# Patient Record
Sex: MALE | Race: WHITE | NOT HISPANIC OR LATINO | Employment: FULL TIME | URBAN - NONMETROPOLITAN AREA
[De-identification: names, ages, dates, MRNs, and addresses within clinical notes are randomized per-mention and may not be internally consistent; named-entity substitution may affect disease eponyms.]

---

## 2018-02-24 ENCOUNTER — OFFICE VISIT CONVERTED (OUTPATIENT)
Dept: FAMILY MEDICINE CLINIC | Age: 33
End: 2018-02-24
Attending: FAMILY MEDICINE

## 2019-10-31 ENCOUNTER — HOSPITAL ENCOUNTER (OUTPATIENT)
Dept: OTHER | Facility: HOSPITAL | Age: 34
Discharge: HOME | End: 2019-10-31
Attending: NURSE PRACTITIONER

## 2019-10-31 ENCOUNTER — OFFICE VISIT CONVERTED (OUTPATIENT)
Dept: FAMILY MEDICINE CLINIC | Age: 34
End: 2019-10-31
Attending: NURSE PRACTITIONER

## 2019-10-31 LAB
ALBUMIN SERPL-MCNC: 4.5 G/DL (ref 3.5–5)
ALBUMIN/GLOB SERPL: 1.5 {RATIO} (ref 1.4–2.6)
ALP SERPL-CCNC: 90 U/L (ref 53–128)
ALT SERPL-CCNC: 58 U/L (ref 10–40)
ANION GAP SERPL CALC-SCNC: 19 MMOL/L (ref 8–19)
AST SERPL-CCNC: 23 U/L (ref 15–50)
BILIRUB SERPL-MCNC: <0.15 MG/DL (ref 0.2–1.3)
BUN SERPL-MCNC: 9 MG/DL (ref 5–25)
BUN/CREAT SERPL: 10 {RATIO} (ref 6–20)
CALCIUM SERPL-MCNC: 9.4 MG/DL (ref 8.7–10.4)
CHLORIDE SERPL-SCNC: 101 MMOL/L (ref 99–111)
CHOLEST SERPL-MCNC: 248 MG/DL (ref 107–200)
CHOLEST/HDLC SERPL: 8.6 {RATIO} (ref 3–6)
CONV CO2: 24 MMOL/L (ref 22–32)
CONV TOTAL PROTEIN: 7.5 G/DL (ref 6.3–8.2)
CREAT UR-MCNC: 0.87 MG/DL (ref 0.7–1.2)
ERYTHROCYTE [DISTWIDTH] IN BLOOD BY AUTOMATED COUNT: 13.2 % (ref 11.6–14.4)
GFR SERPLBLD BASED ON 1.73 SQ M-ARVRAT: >60 ML/MIN/{1.73_M2}
GLOBULIN UR ELPH-MCNC: 3 G/DL (ref 2–3.5)
GLUCOSE SERPL-MCNC: 87 MG/DL (ref 70–99)
HCT VFR BLD AUTO: 45.3 % (ref 42–52)
HDLC SERPL-MCNC: 29 MG/DL (ref 40–60)
HGB BLD-MCNC: 15 G/DL (ref 14–18)
LDLC SERPL CALC-MCNC: 152 MG/DL (ref 70–100)
MCH RBC QN AUTO: 30.3 PG (ref 27–31)
MCHC RBC AUTO-ENTMCNC: 33.1 G/DL (ref 33–37)
MCV RBC AUTO: 91.5 FL (ref 80–96)
OSMOLALITY SERPL CALC.SUM OF ELEC: 288 MOSM/KG (ref 273–304)
PLATELET # BLD AUTO: 328 10*3/UL (ref 130–400)
PMV BLD AUTO: 9.2 FL (ref 9.4–12.4)
POTASSIUM SERPL-SCNC: 4.1 MMOL/L (ref 3.5–5.3)
RBC # BLD AUTO: 4.95 10*6/UL (ref 4.7–6.1)
SODIUM SERPL-SCNC: 140 MMOL/L (ref 135–147)
TRIGL SERPL-MCNC: 337 MG/DL (ref 40–150)
TSH SERPL-ACNC: 2.92 M[IU]/L (ref 0.27–4.2)
VLDLC SERPL-MCNC: 67 MG/DL (ref 5–37)
WBC # BLD AUTO: 10.61 10*3/UL (ref 4.8–10.8)

## 2021-01-26 ENCOUNTER — OFFICE VISIT CONVERTED (OUTPATIENT)
Dept: ORTHOPEDIC SURGERY | Facility: CLINIC | Age: 36
End: 2021-01-26
Attending: PHYSICIAN ASSISTANT

## 2021-02-05 ENCOUNTER — OFFICE VISIT CONVERTED (OUTPATIENT)
Dept: ORTHOPEDIC SURGERY | Facility: CLINIC | Age: 36
End: 2021-02-05
Attending: PHYSICIAN ASSISTANT

## 2021-02-23 ENCOUNTER — OFFICE VISIT CONVERTED (OUTPATIENT)
Dept: ORTHOPEDIC SURGERY | Facility: CLINIC | Age: 36
End: 2021-02-23
Attending: PHYSICIAN ASSISTANT

## 2021-02-26 ENCOUNTER — HOSPITAL ENCOUNTER (OUTPATIENT)
Dept: PREADMISSION TESTING | Facility: HOSPITAL | Age: 36
Discharge: HOME | End: 2021-02-26
Attending: ORTHOPAEDIC SURGERY

## 2021-02-26 LAB — SARS-COV-2 RNA SPEC QL NAA+PROBE: NOT DETECTED

## 2021-03-03 ENCOUNTER — HOSPITAL ENCOUNTER (OUTPATIENT)
Dept: PERIOP | Facility: HOSPITAL | Age: 36
Setting detail: HOSPITAL OUTPATIENT SURGERY
Discharge: HOME | End: 2021-03-03
Attending: ORTHOPAEDIC SURGERY

## 2021-03-18 ENCOUNTER — OFFICE VISIT CONVERTED (OUTPATIENT)
Dept: ORTHOPEDIC SURGERY | Facility: CLINIC | Age: 36
End: 2021-03-18

## 2021-04-20 ENCOUNTER — CONVERSION ENCOUNTER (OUTPATIENT)
Dept: ORTHOPEDIC SURGERY | Facility: CLINIC | Age: 36
End: 2021-04-20

## 2021-04-20 ENCOUNTER — OFFICE VISIT CONVERTED (OUTPATIENT)
Dept: ORTHOPEDIC SURGERY | Facility: CLINIC | Age: 36
End: 2021-04-20

## 2021-05-10 NOTE — H&P
"   History and Physical      Patient Name: Jake Flores   Patient ID: [id number]   Sex: Male   YOB: 1985        Visit Date: January 26, 2021    Provider: Lashell Lerner PA-C   Location: [facility]   Location Address: [address] [place] [address]  [id number]   Location Phone: [phone]          Chief Complaint  · right knee pain      History Of Present Illness  Jake Flores is a 35 year old /White male who presents today to [facility].      He is here for evaluation of right knee injury sustained 1/22/21 when he climbed down from a truck at work. He now has medial joint line pain, especially with pivoting.       Past Surgical History  Eye Implant; Hernia; Kidney         Medication List  naproxen 500 mg oral tablet         Allergy List  NO KNOWN DRUG ALLERGIES       Allergies Reconciled  Family Medical History  Heart Disease; Diabetes, unspecified type         Social History  Alcohol Use (Never); lives with spouse; Recreational Drug Use (Never); Single.; Tobacco (Current every day); Working         Review of Systems  · Constitutional  o Denies  o : fever, chills, weight loss  · Cardiovascular  o Denies  o : chest pain, shortness of breath  · Gastrointestinal  o Denies  o : liver disease, heartburn, nausea, blood in stools  · Genitourinary  o Denies  o : painful urination, blood in urine  · Integument  o Denies  o : rash, itching  · Neurologic  o Denies  o : headache, weakness, loss of consciousness  · Musculoskeletal  o Admits  o : painful, swollen joints  · Psychiatric  o Denies  o : drug/alcohol addiction, anxiety, depression      Vitals  Date Time BP Position Site L\R Cuff Size HR RR TEMP (F) WT  HT  BMI kg/m2 BSA m2 O2 Sat FR L/min FiO2        01/26/2021 10:43 AM      112 - R   250lbs 0oz 5'  11\" 34.87 2.38 98 %            Physical Examination  · Constitutional  o Appearance  o : well developed, well-nourished, no obvious deformities present  · Head and " Face  o Head  o :   § Inspection  § : normocephalic  o Face  o :   § Inspection  § : no facial lesions  · Eyes  o Conjunctivae  o : conjunctivae normal  o Sclerae  o : sclerae white  · Ears, Nose, Mouth and Throat  o Ears  o :   § External Ears  § : appearance within normal limits  § Hearing  § : intact  o Nose  o :   § External Nose  § : appearance normal  · Neck  o Inspection/Palpation  o : normal appearance  o Range of Motion  o : full range of motion  · Respiratory  o Respiratory Effort  o : breathing unlabored  o Inspection of Chest  o : normal appearance  o Auscultation of Lungs  o : no audible wheezing or rales  · Cardiovascular  o Heart  o : regular rate  · Gastrointestinal  o Abdominal Examination  o : soft and non-tender  · Skin and Subcutaneous Tissue  o General Inspection  o : intact, no rashes  · Psychiatric  o General  o : Alert and oriented x3  o Judgement and Insight  o : judgment and insight intact  o Mood and Affect  o : mood normal, affect appropriate  · Right Knee  o Inspection  o : 1+ effusion. Tender medial joint line. + guarding. Pain with valgus stress. Pain with weight bearing. + Gina's. + Apley's. Pain with weight bearing. Neurovascularly intact. Sensation intact. Good tone of quads, hamstrings, dorsiflexors, plantarflexors.               Assessment  · Right knee pain, unspecified chronicity     719.46/M25.561  · Knee injury     959.7/S89.90XA      Plan  · Medications  o Medications have been Reconciled  o Transition of Care or Provider Policy  · Instructions  o Reviewed the patient's Past Medical, Social, and Family history as well as the ROS at today's visit, no changes.  o Call or return if worsening symptoms.  o Proceed with right knee MRI. Follow up after test. Remain off work.  o Electronically Identified Patient Education Materials Provided Electronically            Electronically Signed by: DEMI PolancoC -Author on January 26, 2021 11:43:53 AM  Electronically Co-signed by:  Sarah Laguna MD -Reviewer on January 26, 2021 09:49:43 PM

## 2021-05-14 VITALS — HEART RATE: 112 BPM | WEIGHT: 250 LBS | BODY MASS INDEX: 35 KG/M2 | HEIGHT: 71 IN | OXYGEN SATURATION: 98 %

## 2021-05-14 VITALS — WEIGHT: 254.5 LBS | HEIGHT: 71 IN | HEART RATE: 105 BPM | OXYGEN SATURATION: 97 % | BODY MASS INDEX: 35.63 KG/M2

## 2021-05-14 VITALS — HEIGHT: 71 IN | OXYGEN SATURATION: 96 % | BODY MASS INDEX: 35.45 KG/M2 | HEART RATE: 117 BPM | WEIGHT: 253.25 LBS

## 2021-05-14 VITALS — HEART RATE: 117 BPM | HEIGHT: 71 IN | OXYGEN SATURATION: 97 % | BODY MASS INDEX: 35.98 KG/M2 | WEIGHT: 257 LBS

## 2021-05-14 VITALS — WEIGHT: 252 LBS | HEIGHT: 71 IN | OXYGEN SATURATION: 97 % | HEART RATE: 73 BPM | BODY MASS INDEX: 35.28 KG/M2

## 2021-05-14 NOTE — PROGRESS NOTES
"   Progress Note      Patient Name: Jake Flores   Patient ID: [id number]   Sex: Male   YOB: 1985        Visit Date: February 5, 2021    Provider: Lashell Lerner PA-C   Location: [facility]   Location Address: [address] [place] [address]  [id number]   Location Phone: [phone]          Chief Complaint  · Follow up right knee pain      History Of Present Illness  Jake Flores is a 35 year old /White male who presents today to [facility].      He is here for follow up right knee injury sustained at work, while getting out of a truck 1/22/21. He had MRI. He states medial knee pain and sensation of instability.       Past Surgical History  Eye Implant; Hernia; Kidney         Medication List  naproxen 500 mg oral tablet         Allergy List  NO KNOWN DRUG ALLERGIES       Allergies Reconciled  Family Medical History  Heart Disease; Diabetes, unspecified type         Social History  Alcohol Use (Never); lives with spouse; Recreational Drug Use (Never); Single.; Tobacco (Current every day); Working         Review of Systems  · Constitutional  o Denies  o : fever, chills, weight loss  · Cardiovascular  o Denies  o : chest pain, shortness of breath  · Gastrointestinal  o Denies  o : liver disease, heartburn, nausea, blood in stools  · Genitourinary  o Denies  o : painful urination, blood in urine  · Integument  o Denies  o : rash, itching  · Neurologic  o Denies  o : headache, weakness, loss of consciousness  · Musculoskeletal  o Admits  o : painful, swollen joints  · Psychiatric  o Denies  o : drug/alcohol addiction, anxiety, depression      Vitals  Date Time BP Position Site L\R Cuff Size HR RR TEMP (F) WT  HT  BMI kg/m2 BSA m2 O2 Sat FR L/min FiO2        02/05/2021 09:42 AM      117 - R   257lbs 0oz 5'  11\" 35.84 2.42 97 %            Physical Examination  · Constitutional  o Appearance  o : well developed, well-nourished, no obvious deformities present  · Head and " Face  o Head  o :   § Inspection  § : normocephalic  o Face  o :   § Inspection  § : no facial lesions  · Eyes  o Conjunctivae  o : conjunctivae normal  o Sclerae  o : sclerae white  · Ears, Nose, Mouth and Throat  o Ears  o :   § External Ears  § : appearance within normal limits  § Hearing  § : intact  o Nose  o :   § External Nose  § : appearance normal  · Neck  o Inspection/Palpation  o : normal appearance  o Range of Motion  o : full range of motion  · Respiratory  o Respiratory Effort  o : breathing unlabored  o Inspection of Chest  o : normal appearance  o Auscultation of Lungs  o : no audible wheezing or rales  · Cardiovascular  o Heart  o : regular rate  · Gastrointestinal  o Abdominal Examination  o : soft and non-tender  · Skin and Subcutaneous Tissue  o General Inspection  o : intact, no rashes  · Psychiatric  o General  o : Alert and oriented x3  o Judgement and Insight  o : judgment and insight intact  o Mood and Affect  o : mood normal, affect appropriate  · Right Knee  o Inspection  o : + effusion. Tender medial joint line. ROM 10-90 degrees. + Lachman's. + Gina's. Pain with weight bearing. All compartments soft and well perfused. Neurovascularly intact.   · Imaging  o Imaging  o : MRI Right knee 2/2/21: 1. Complete rupture of proximal third of ACL at the femoral attachment. 2. Vertically oriented longitundinal tear along the peripheral third of the posterior horn/posterior body segment medial meniscus, contacting the inferior articular surface. 3. Minimally impacted fracture of the posterior lip lateral tibial plateau. Nondepressed subarticular impaction fractures of the anterolateral weight bearing lateral femoral condyle and posterior lip of the medial tibial plateau. 4. Mild bone contusions of the anteromedial aspect of the medial tibial plateau and far medial aspect of the medial femoral condyle. 5. No evidence of significant articular cartilage loss. 6. Moderate joint effusion with 4cm  popliteal cyst and 5mm loose body posterior attachment of the PCL.          Assessment  · ACL tear     844.2/S83.519A  · MMT (medial meniscus tear)     836.0/S83.249A  · Pain: Knee     719.46/M25.569      Plan  · Medications  o Medications have been Reconciled  o Transition of Care or Provider Policy  · Instructions  o Dr. Laguna saw and examined the patient and agrees with plan.   o MRI reviewed by Dr. Laguna.  o Reviewed the patient's Past Medical, Social, and Family history as well as the ROS at today's visit, no changes.  o Call or return if worsening symptoms.  o Start pre-op PT. Follow up 2.5 weeks, check progress in ROM. Schedule surgery if ready.   o Electronically Identified Patient Education Materials Provided Electronically            Electronically Signed by: ABBY Polanco-C -Author on February 5, 2021 11:07:28 AM  Electronically Co-signed by: Sarah Laguna MD -Reviewer on February 8, 2021 05:14:11 PM

## 2021-05-14 NOTE — PROGRESS NOTES
"   Progress Note      Patient Name: Jake Flores   Patient ID: [id number]   Sex: Male   YOB: 1985        Visit Date: February 23, 2021    Provider: Lashell Lerner PA-C   [facility]   Location Address: [address] [place] [address]  [id number]   Location Phone: [phone]          Chief Complaint  · right knee pain      History Of Present Illness  Jake Flores is a 36 year old /White male who presents today to [facility].      He is here for check up prior to right ACL reconstruction. He has made good progress in PT. He states pain and swelling improved some.       Past Surgical History  Eye Implant; Hernia; Kidney         Medication List  naproxen 500 mg oral tablet         Allergy List  NO KNOWN DRUG ALLERGIES       Allergies Reconciled  Family Medical History  Heart Disease; Diabetes, unspecified type         Social History  Alcohol Use (Never); lives with spouse; Recreational Drug Use (Never); Single.; Tobacco (Current every day); Working         Review of Systems  · Constitutional  o Denies  o : fever, chills, weight loss  · Cardiovascular  o Denies  o : chest pain, shortness of breath  · Gastrointestinal  o Denies  o : liver disease, heartburn, nausea, blood in stools  · Genitourinary  o Denies  o : painful urination, blood in urine  · Integument  o Denies  o : rash, itching  · Neurologic  o Denies  o : headache, weakness, loss of consciousness  · Musculoskeletal  o Admits  o : painful, swollen joints  · Psychiatric  o Denies  o : drug/alcohol addiction, anxiety, depression      Vitals  Date Time BP Position Site L\R Cuff Size HR RR TEMP (F) WT  HT  BMI kg/m2 BSA m2 O2 Sat FR L/min FiO2 HC       02/23/2021 02:55 PM      105 - R   254lbs 8oz 5'  11\" 35.5 2.4 97 %            Physical Examination  · Constitutional  o Appearance  o : well developed, well-nourished, no obvious deformities present  · Head and Face  o Head  o :   § Inspection  § : " normocephalic  o Face  o :   § Inspection  § : no facial lesions  · Eyes  o Conjunctivae  o : conjunctivae normal  o Sclerae  o : sclerae white  · Ears, Nose, Mouth and Throat  o Ears  o :   § External Ears  § : appearance within normal limits  § Hearing  § : intact  o Nose  o :   § External Nose  § : appearance normal  · Neck  o Inspection/Palpation  o : normal appearance  o Range of Motion  o : full range of motion  · Respiratory  o Respiratory Effort  o : breathing unlabored  o Inspection of Chest  o : normal appearance  o Auscultation of Lungs  o : no audible wheezing or rales  · Cardiovascular  o Heart  o : regular rate  · Gastrointestinal  o Abdominal Examination  o : soft and non-tender  · Skin and Subcutaneous Tissue  o General Inspection  o : intact, no rashes  · Psychiatric  o General  o : Alert and oriented x3  o Judgement and Insight  o : judgment and insight intact  o Mood and Affect  o : mood normal, affect appropriate  · Right Knee  o Inspection  o : Trace effusion. Tender medial joint line. ROM 10-90 degrees. + Lachman's. + Gina's. Pain with weight bearing. All compartments soft and well perfused. Neurovascularly intact.   · Imaging  o Imaging  o : MRI Right knee 2/2/21: 1. Complete rupture of proximal third of ACL at the femoral attachment. 2. Vertically oriented longitundinal tear along the peripheral third of the posterior horn/posterior body segment medial meniscus, contacting the inferior articular surface. 3. Minimally impacted fracture of the posterior lip lateral tibial plateau. Nondepressed subarticular impaction fractures of the anterolateral weight bearing lateral femoral condyle and posterior lip of the medial tibial plateau. 4. Mild bone contusions of the anteromedial aspect of the medial tibial plateau and far medial aspect of the medial femoral condyle. 5. No evidence of significant articular cartilage loss. 6. Moderate joint effusion with 4cm popliteal cyst and 5mm loose body  posterior attachment of the PCL.          Assessment  · ACL tear     844.2/S83.519A  · MMT (medial meniscus tear)     836.0/S83.249A  · Pain: Knee     719.46/M25.569      Plan  · Medications  o Medications have been Reconciled  o Transition of Care or Provider Policy  · Instructions  o Reviewed the patient's Past Medical, Social, and Family history as well as the ROS at today's visit, no changes.  o Call or return if worsening symptoms.  o Discussed surgery.  o Risks/benefits discussed with patient including, but not limited to: infection, bleeding, neurovascular damage, re-rupture, aesthetic deformity, need for further surgery, and death.  o Discussed with patient the implant type being used during surgery and patient understands and desires to proceed.  o Proceed with right knee arthroscopic ACL reconstruction, pMMT.  o Electronically Identified Patient Education Materials Provided Electronically            Electronically Signed by: ABBY Polanco-C -Author on February 23, 2021 03:21:09 PM  Electronically Co-signed by: Sarah Laguna MD -Reviewer on February 24, 2021 07:42:15 AM

## 2021-05-14 NOTE — PROGRESS NOTES
"   Progress Note      Patient Name: Jake Flores   Patient ID: [id number]   Sex: Male   YOB: 1985        Visit Date: April 20, 2021    Provider: Kevin Michaud PA-C   Location: [facility]   Location Address: [address] [place] [address]  [id number]   Location Phone: [phone]          Chief Complaint  · Follow up right knee pain      History Of Present Illness  Jake Flores is a 36 year old /White male who presents today to [facility].      Patient follows up today for right knee arthroscopy with ACL reconstruction and partial meniscectomy performed 3/3/2021 by Dr. Laguna.  Patient reports pain has improved since the time of surgery.  Patient has been adherent to interventions and instructions.       Past Surgical History  Eye Implant; Hernia; Kidney         Medication List  naproxen 500 mg oral tablet         Allergy List  NO KNOWN DRUG ALLERGIES         Family Medical History  Heart Disease; Diabetes, unspecified type         Social History  Alcohol Use (Never); lives with spouse; Recreational Drug Use (Never); Single.; Tobacco (Current every day); Working         Review of Systems  · Constitutional  o Denies  o : fever, chills, weight loss  · Cardiovascular  o Denies  o : chest pain, shortness of breath  · Gastrointestinal  o Denies  o : liver disease, heartburn, nausea, blood in stools  · Genitourinary  o Denies  o : painful urination, blood in urine  · Integument  o Denies  o : rash, itching  · Neurologic  o Denies  o : headache, weakness, loss of consciousness  · Musculoskeletal  o Denies  o : painful, swollen joints  · Psychiatric  o Denies  o : drug/alcohol addiction, anxiety, depression      Vitals  Date Time BP Position Site L\R Cuff Size HR RR TEMP (F) WT  HT  BMI kg/m2 BSA m2 O2 Sat FR L/min FiO2 HC       04/20/2021 11:05 AM      73 - R   251lbs 16oz 5'  11\" 35.15 2.39 97 %            Physical Examination  · Constitutional  o Appearance  o : " "well developed, well-nourished, no obvious deformities present  · Head and Face  o Head  o :   § Inspection  § : normocephalic  o Face  o :   § Inspection  § : no facial lesions  · Eyes  o Conjunctivae  o : conjunctivae normal  o Sclerae  o : sclerae white  · Ears, Nose, Mouth and Throat  o Ears  o :   § External Ears  § : appearance within normal limits  § Hearing  § : intact  o Nose  o :   § External Nose  § : appearance normal  · Neck  o Inspection/Palpation  o : normal appearance  o Range of Motion  o : full range of motion  · Respiratory  o Respiratory Effort  o : breathing unlabored  o Inspection of Chest  o : normal appearance  o Auscultation of Lungs  o : no audible wheezing or rales  · Cardiovascular  o Heart  o : regular rate  · Gastrointestinal  o Abdominal Examination  o : soft and non-tender  · Skin and Subcutaneous Tissue  o General Inspection  o : intact, no rashes  · Psychiatric  o General  o : Alert and oriented x3  o Judgement and Insight  o : judgment and insight intact  o Mood and Affect  o : mood normal, affect appropriate  · Right Knee  o Inspection  o : Surgical sites appreciated to be healing appropriately with good scar formation that is supple. Range of motion is 0 degrees of extension to 105 degrees of flexion. Knee is stable to provocative testing. Neuro vascularly intact distally. Minimally antalgic but otherwise stable gait favoring the right.          Assessment  · Pain: Knee     719.46/M25.569  · Decreased range of motion (ROM) of right knee     719.56/M25.661  · Surgical aftercare, musculoskeletal system     V58.78/Z47.89      Plan  · Instructions  o Reviewed the patient's Past Medical, Social, and Family history as well as the ROS at today's visit, no changes.  o Call or return if worsening symptoms.  o Patient may progressively advance his activities as discussed in clinic today. Patient is to continue with physical therapy. Patient is to be placed in a \"playmaker\" type brace. " Patient to follow-up in 6 weeks to evaluate his course of healing.  o Portions of this note were generated with voice recognition software. While efforts have been made to proofread the text, some sound alike errors may still persist.             Electronically Signed by: ABBY Chu-LESLEE -Author on April 20, 2021 12:56:26 PM  Electronically Co-signed by: Sarah Laguna MD -Reviewer on April 20, 2021 05:58:43 PM

## 2021-05-14 NOTE — PROGRESS NOTES
"   Progress Note      Patient Name: Jake Flores   Patient ID: [id number]   Sex: Male   YOB: 1985        Visit Date: March 18, 2021    Provider: Kevin Michaud PA-C   Location: [facility]   Location Address: [address] [place] [address]  [id number]   Location Phone: [phone]          Chief Complaint  · right knee pain      History Of Present Illness  Jake Flores is a 36 year old /White male who presents today to [facility].      Patient follows up today for right knee ACL tear and meniscus tear status post right knee arthroscopic ACL reconstruction and partial meniscectomy performed 3/3/2021 by Dr. Laguna.  Patient reports pain has improved since the time of surgery.  Patient has been adherent to interventions and instructions.       Past Surgical History  Eye Implant; Hernia; Kidney         Medication List  naproxen 500 mg oral tablet         Allergy List  NO KNOWN DRUG ALLERGIES         Family Medical History  Heart Disease; Diabetes, unspecified type         Social History  Alcohol Use (Never); lives with spouse; Recreational Drug Use (Never); Single.; Tobacco (Current every day); Working         Review of Systems  · Constitutional  o Denies  o : fever, chills, weight loss  · Cardiovascular  o Denies  o : chest pain, shortness of breath  · Gastrointestinal  o Denies  o : liver disease, heartburn, nausea, blood in stools  · Genitourinary  o Denies  o : painful urination, blood in urine  · Integument  o Denies  o : rash, itching  · Neurologic  o Denies  o : headache, weakness, loss of consciousness  · Musculoskeletal  o Denies  o : painful, swollen joints  · Psychiatric  o Denies  o : drug/alcohol addiction, anxiety, depression      Vitals  Date Time BP Position Site L\R Cuff Size HR RR TEMP (F) WT  HT  BMI kg/m2 BSA m2 O2 Sat FR L/min FiO2 HC       03/18/2021 01:58 PM      117 - R   253lbs 4oz 5'  11\" 35.32 2.4 96 %            Physical " Examination  · Constitutional  o Appearance  o : well developed, well-nourished, no obvious deformities present  · Head and Face  o Head  o :   § Inspection  § : normocephalic  o Face  o :   § Inspection  § : no facial lesions  · Eyes  o Conjunctivae  o : conjunctivae normal  o Sclerae  o : sclerae white  · Ears, Nose, Mouth and Throat  o Ears  o :   § External Ears  § : appearance within normal limits  § Hearing  § : intact  o Nose  o :   § External Nose  § : appearance normal  · Neck  o Inspection/Palpation  o : normal appearance  o Range of Motion  o : full range of motion  · Respiratory  o Respiratory Effort  o : breathing unlabored  o Inspection of Chest  o : normal appearance  o Auscultation of Lungs  o : no audible wheezing or rales  · Cardiovascular  o Heart  o : regular rate  · Gastrointestinal  o Abdominal Examination  o : soft and non-tender  · Skin and Subcutaneous Tissue  o General Inspection  o : intact, no rashes  · Psychiatric  o General  o : Alert and oriented x3  o Judgement and Insight  o : judgment and insight intact  o Mood and Affect  o : mood normal, affect appropriate  · Right Knee  o Inspection  o : Surgical sites are appreciated to be healing appropriately and sutures are removed. ROM demonstrated today is 0 degrees of extension to approximately 90 degrees of flexion. Swelling is present about the knee.          Assessment  · Pain: Knee     719.46/M25.569  · Surgical aftercare, musculoskeletal system     V58.78/Z47.89  · Rupture of anterior cruciate ligament of right knee, subsequent encounter       Sprain of anterior cruciate ligament of right knee, subsequent encounter     V58.89/S83.511D      Plan  · Instructions  o Reviewed the patient's Past Medical, Social, and Family history as well as the ROS at today's visit, no changes.  o Call or return if worsening symptoms.  o Patient is 2 weeks postop at this time and sutures are removed. Patient is to continue use of T ROM brace with it  locked at 0 degrees of extension to 90 degrees of flexion. Patient is to continue with physical therapy and follow-up in 4 weeks.            Electronically Signed by: Kevin Michaud PA-C -Author on March 18, 2021 07:29:52 PM

## 2021-05-18 NOTE — PROGRESS NOTES
Jake Flores 1985     Office/Outpatient Visit    Visit Date: Thu, Oct 31, 2019 04:27 pm    Provider: Yvette Tomlinson N.P. (Assistant: Meli Roca MA)    Location: [facility]        Electronically signed by Yvette Tomlinson N.P. on  11/01/2019 01:03:48 PM                             SUBJECTIVE:        CC:     Mr. Flores is a 34 year old White male.  He presents with axillary itching & hair loss.          HPI:         Mr. Flores presents with health checkup.  He cannot recall when he last had a physical exam.  He's had vision screening done <6 months ago and this was abnormal.  He is current with his Td.  He is not current with influenza immunization.          PHQ-9 Depression Screening: Completed form scanned and in chart; Total Score 3     ROS:     CONSTITUTIONAL:  Negative for chills, fatigue, fever, and weight change.      EYES:  Negative for blurred vision, eye pain, and photophobia.      E/N/T:  Negative for hearing problems, E/N/T pain, congestion, rhinorrhea, epistaxis, hoarseness, and dental problems.      CARDIOVASCULAR:  Negative for chest pain, palpitations, tachycardia, orthopnea, and edema.      RESPIRATORY:  Negative for cough, dyspnea, and hemoptysis.      GASTROINTESTINAL:  Negative for abdominal pain, heartburn, constipation, diarrhea, and stool changes.      GENITOURINARY:  Negative for dysuria, genital lesions, hematuria, impotence, polyuria, and changes in urine stream.      MUSCULOSKELETAL:  Negative for arthralgias, back pain, and myalgias.      NEUROLOGICAL:  Negative for dizziness, headaches, paresthesias, and weakness.      ENDOCRINE:  Negative for hair loss, heat/cold intolerance, polydipsia, and polyphagia.      PSYCHIATRIC:  Negative for anxiety, depression, and sleep disturbances.          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          Alcohol: Frequency: Socially     Caffeine:  He admits to consuming caffeine via tea ( 3 servings per day ) and soda ( 4 servings per day ).          Substance Abuse History:     Last Reviewed on 2/24/2018 12:50 PM by Latricia Juarez        Mental Health History:     Last Reviewed on 2/24/2018 12:50 PM by Latricia Juarez        Communicable Diseases (eg STDs):     Last Reviewed on 2/24/2018 12:50 PM by Latricia Juarez            Current Problems:     Last Reviewed on 2/24/2018 12:50 PM by Latricia Juarez      None Recorded         Immunizations:     None        Allergies:     Last Reviewed on 2/24/2018 12:50 PM by Latricia Juarez      No Known Drug Allergies.         Current Medications:     Last Reviewed on 10/31/2019 04:30 PM by Meli Roca      No Known Medications.         OBJECTIVE:        Vitals:         Historical:     02/24/2018  BP:   111/64 mm Hg ( (left arm, , sitting, );)     02/24/2018  Wt:   242.3lbs        Current: 10/31/2019 4:32:52 PM    Ht:  5 ft, 10 in;  Wt: 239.8 lbs;  BMI: 34.4    T: 98.3 F (oral);  BP: 142/93 mm Hg (right arm, sitting);  P: 80 bpm (right arm (BP Cuff), sitting)        Repeat:     4:33:55 PM     BP:   132/85mm Hg (right arm, sitting, 97)         Exams:     PHYSICAL EXAM:     GENERAL:  well developed and nourished; appropriately groomed; in no apparent distress;     EYES: PERRL, EOMI     E/N/T: EARS: bilateral TMs are normal;  NOSE: normal nasal mucosa;  OROPHARYNX: posterior pharynx, including tonsils, tongue, and uvula are normal;     NECK: thyroid is non-palpable;     RESPIRATORY: normal respiratory rate and pattern with no distress; normal breath sounds with no rales, rhonchi, wheezes or rubs;     CARDIOVASCULAR: normal rate; rhythm is regular;     Peripheral Pulses: posterior tibial: 2+ amplitude, no bruits; no edema;     GASTROINTESTINAL: nontender; normal bowel sounds; no organomegaly;     GENITOURINARY: declines exam;     LYMPHATIC: no enlargement of cervical or facial nodes;     MUSCULOSKELETAL:  Normal range of motion, strength and tone;     NEUROLOGIC: mental status: alert and oriented x 3; GROSSLY INTACT     PSYCHIATRIC:  appropriate affect and demeanor; normal speech pattern; grossly normal memory;         ASSESSMENT           V70.0   Z00.00  Health checkup              DDx:     V79.0   Z13.31  Screening for depression              DDx:         ORDERS:         Lab Orders:       [id number]  [address] CBC w/o diff  (Send-Out)         73043  [facility] Comp. Metabolic Panel  (Send-Out)         73295  [facility] Lipid Panel  (Send-Out)         30517  [facility] TSH  (Send-Out)           Other Orders:         Depression screen negative  (In-House)                   PLAN: not fasting          Health checkup     LABORATORY:  Labs ordered to be performed today include CBC W/O DIFF, Comprehensive metabolic panel, lipid panel, and TSH.      COUNSELING was provided today regarding the following topics: healthy eating habits, low cholesterol diet, low salt diet, regular exercise, testicular self-exam, STD prevention, and contraception.            Orders:       [id number]  [address] CBC w/o diff  (Send-Out)         [id number]  [facility] Comp. Metabolic Panel  (Send-Out)         61429  [facility] Lipid Panel  (Send-Out)         [id number]  [facility] TSH  (Send-Out)             Patient Education Handouts:       Physical Exam 30-39 year, Male           Screening for depression      MIPS PHQ-9 Depression Screening: Completed form scanned and in chart; Total Score 3; Negative Depression Screen   Smoking cessation encouraged. Counseling for less than 3 minutes.            Orders:         Depression screen negative  (In-House)               Patient Recommendations:        For  Health checkup:     Limit dietary intake of fat (especially saturated fat) and cholesterol.  Eat a variety of foods, including plenty of fruits, vegetables, and grain containg fiber, limit fat intake to 30% of total calories. Balance caloric intake with energy expended. Maintaining regular physical activity is advised to help prevent heart disease, hypertension, diabetes, and obesity.    Testicular cancer is the #1 cancer in men ages 15-35.  You should regularly examine your testicles for knots, lumps, or tenderness. Testicular pain, even if not associated with any masses, needs to be evaluated by your doctor! Sexually transmitted diseases may be prevented by abstaining from sexual activity or avoiding sexual contact with high risk partners, and consistently using a condom or female barrier contraceptives plus spermacide.              CHARGE CAPTURE           **Please note: ICD descriptions below are intended for billing purposes only and may not represent clinical diagnoses**        Primary Diagnosis:         V70.0 Health checkup            Z00.00    Encounter for general adult medical examination without abnormal findings              Orders:          [id number]   Preventive medicine, established patient, age 18-39 years  (In-House)           V79.0 Screening for depression            Z13.31    Encounter for screening for depression              Orders:             Depression screen negative  (In-House)

## 2021-05-18 NOTE — PROGRESS NOTES
Jake Flores 1985     Office/Outpatient Visit    Visit Date: Sat, Feb 24, 2018 12:18 pm    Provider: Latricia Juarez MD (Assistant: Clare Valladares LPN)    Location: [facility]        Electronically signed by Latricia Juarez MD on  02/24/2018 12:53:36 PM                             SUBJECTIVE:        CC:     Mr. Flores is a 33 year old White male.  This is his first visit to the clinic.  Fell 2 weeks ago and rolled right ankle over. He went to an immediate care clinic and they xrayed it and said no fx seen, it was probably a yenni. Ankle swollen .          HPI: Jake is here today with c/o R ankle pain and swelling after a fall 2 weeks ago.  He was walking across a parking lot and stepped funny and fell.  He was wearing a pair of lace-up boots.  He could not initially bear weight on it when he got up.  There was immediate pain and swelling with bruising.  He went home and put ice on the ankle and elevated it.  The next day, he still could not walk on it.  He waited another couple of days before he went to an immediate care clinic where he had XRs done that were reportedly negative for fracture.          Yesterday, he started feeling burning pain.  The swelling has continued to be pretty severe.      ROS:     CONSTITUTIONAL:  Negative for fatigue and fever.      CARDIOVASCULAR:  Negative for chest pain and palpitations.      RESPIRATORY:  Negative for recent cough and dyspnea.      MUSCULOSKELETAL:  Positive for arthralgias (R ankle pain).   Negative for myalgias.          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           Alcohol: Frequency: Socially     Caffeine:  He admits to consuming caffeine via tea ( 3 servings per day ) and soda ( 4 servings per day ).          Substance Abuse History:     Last Reviewed on 2/24/2018 12:50 PM by Latricia Juarez        Mental Health History:     Last Reviewed on 2/24/2018 12:50 PM by Latricia Juarez        Communicable Diseases (eg STDs):     Last Reviewed on 2/24/2018 12:50 PM by Latricia Juarez            Current Problems:       None Recorded         Immunizations:     None        Allergies:     Last Reviewed on 2/24/2018 12:22 PM by Clare Valladares      No Known Drug Allergies.         Current Medications:     Last Reviewed on 2/24/2018 12:23 PM by Clare Valladares      No Known Medications.         OBJECTIVE:        Vitals:         Current: 2/24/2018 12:20:46 PM    Ht:  5 ft, 10 in;  Wt: 242.3 lbs;  BMI: 34.8    T: 98 F (oral);  BP: 111/64 mm Hg (left arm, sitting);  P: 92 bpm        Exams:     PHYSICAL EXAM:     GENERAL: Vitals recorded well developed, well nourished;  well groomed;  no apparent distress;     RESPIRATORY: normal respiratory rate and pattern with no distress; normal breath sounds with no rales, rhonchi, wheezes or rubs;     CARDIOVASCULAR: normal rate; rhythm is regular;  no systolic murmur;     MUSCULOSKELETAL: gait: affected by a right leg limp;  normal overall tone R ankle -- mild erythema, no warmth, diffuse edema over the distal shin down to the foot, worst over the lateral malleolus; mildly TTP over lateral malleolus, no TTP over medial malleolus; +pain with plantar flexion/inversion/eversion against resistance; strength appeared normal for all ROM;         ASSESSMENT           719.47   M25.571  Ankle pain              DDx:         ORDERS:         Radiology/Test Orders:       [id number]  Radiologic examination, right ankle complete minimum 3 views  (Send-Out)                   PLAN:          Ankle pain I think this is most likely to just be a bad sprain, but  we will repeat ankle XR to make sure an occult fracture was not missed.  In the meantime, continue conservative measures with RICE therapy.         RADIOLOGY:  I have ordered a right ankle xray to be done today.            Orders:       [id number]  Radiologic examination, right ankle complete minimum 3 views  (Send-Out)             Patient Education Handouts:       Sprains and Strains              Patient Recommendations:        For  Ankle pain:     right ankle x-ray             CHARGE CAPTURE           **Please note: ICD descriptions below are intended for billing purposes only and may not represent clinical diagnoses**        Primary Diagnosis:         719.47 Ankle pain            M25.571    Pain in right ankle and joints of right foot              Orders:          [id number]   Office visit - new pt, level 2  (In-House)

## 2021-06-01 ENCOUNTER — OFFICE VISIT CONVERTED (OUTPATIENT)
Dept: ORTHOPEDIC SURGERY | Facility: CLINIC | Age: 36
End: 2021-06-01
Attending: PHYSICIAN ASSISTANT

## 2021-06-01 ENCOUNTER — CONVERSION ENCOUNTER (OUTPATIENT)
Dept: ORTHOPEDIC SURGERY | Facility: CLINIC | Age: 36
End: 2021-06-01

## 2021-06-05 NOTE — PROGRESS NOTES
Progress Note      Patient Name: Jake Flores   Patient ID: [id number]   Sex: Male   YOB: 1985    Referring Provider: Sarah Laguna MD    Visit Date: June 1, 2021    Provider: Mica Rainey PA-C   Location: [facility]   Location Address: [address] [place] [address]  [id number]   Location Phone: [phone]          Chief Complaint  · Follow up Right Knee ACL Reconstruction      History Of Present Illness  Jake Flores is a 36 year old /White male who presents today to [facility].      Patient presents today for a follow-up of right knee ACL reconstruction performed on 3/3/2021 by Dr. Laguna. Patient states that he has been attending therapy 3 days a week and they have taken him down to 2 days a week. Therapy has begun strengthening exercises. He states that he has some occasional soreness in his right knee but overall is doing well.          Past Surgical History  Eye Implant; Hernia; Kidney         Medication List  naproxen 500 mg oral tablet         Allergy List  NO KNOWN DRUG ALLERGIES       Allergies Reconciled  Family Medical History  Heart Disease; Diabetes, unspecified type         Social History  Alcohol Use (Never); lives with spouse; Recreational Drug Use (Never); Single.; Tobacco (Current every day); Working         Review of Systems  · Constitutional  o Denies  o : fever, chills, weight loss  · Cardiovascular  o Denies  o : chest pain, shortness of breath  · Gastrointestinal  o Denies  o : liver disease, heartburn, nausea, blood in stools  · Genitourinary  o Denies  o : painful urination, blood in urine  · Integument  o Denies  o : rash, itching  · Neurologic  o Denies  o : headache, weakness, loss of consciousness  · Musculoskeletal  o Denies  o : painful, swollen joints  · Psychiatric  o Denies  o : drug/alcohol addiction, anxiety, depression      Vitals  Date Time BP Position Site L\R Cuff Size HR RR TEMP (F) WT  HT  BMI kg/m2 BSA m2 O2 Sat  "FR L/min FiO2 HC       06/01/2021 11:12 AM      79 - R   250lbs 16oz 5'  11\" 35.01 2.39 97 %            Physical Examination  · Constitutional  o Appearance  o : well developed, well-nourished, no obvious deformities present  · Head and Face  o Head  o :   § Inspection  § : normocephalic  o Face  o :   § Inspection  § : no facial lesions  · Eyes  o Conjunctivae  o : conjunctivae normal  o Sclerae  o : sclerae white  · Ears, Nose, Mouth and Throat  o Ears  o :   § External Ears  § : appearance within normal limits  § Hearing  § : intact  o Nose  o :   § External Nose  § : appearance normal  · Neck  o Inspection/Palpation  o : normal appearance  o Range of Motion  o : full range of motion  · Respiratory  o Respiratory Effort  o : breathing unlabored  o Inspection of Chest  o : normal appearance  o Auscultation of Lungs  o : no audible wheezing or rales  · Cardiovascular  o Heart  o : regular rate  · Gastrointestinal  o Abdominal Examination  o : soft and non-tender  · Skin and Subcutaneous Tissue  o General Inspection  o : intact, no rashes  · Psychiatric  o General  o : Alert and oriented x3  o Judgement and Insight  o : judgment and insight intact  o Mood and Affect  o : mood normal, affect appropriate  · Right Knee  o Inspection  o : Calf supple, non-tender, no signs of DVT. Incision well healed. No signs of infection. Non-tender to palpation. Negative Lachman. Negative posterior sag. Stable to valgus/varus stress. Good strength in quadriceps, hamstrings, dorsiflexors, and plantar flexors. Sensation grossly intact. Neurovascular intact. Mild swelling. No skin discoloration or atrophy. Weight bearing. Non-antalgic gait. Full extension. Flexion to 110 degrees.           Assessment  · Aftercare following right knee ACL reconstruction     V54.81  · Right knee pain, unspecified chronicity     719.46/M25.561      Plan  · Medications  o Medications have been Reconciled  o Transition of Care or Provider " Policy  · Instructions  o Dr. Laguna saw and examined the patient and agrees with plan.   o Reviewed the patient's Past Medical, Social, and Family history as well as the ROS at today's visit, no changes.  o Call or return if worsening symptoms.  o Follow up in 6 weeks.  o Discussed treatment plans and diagnosis with the patient. Patient was given work restrictions, such as no ladders, squatting, etc. He will continue physical therapy.   o The above service was scribed by Zulema Avilez on my behalf and I attest to the accuracy of the note. mc            Electronically Signed by: Zulema Avilez-, Other -Author on Gladys 3, 2021 08:25:10 AM  Electronically Co-signed by: Sarah Laguna MD -Reviewer on June 4, 2021 09:54:55 AM

## 2021-07-01 VITALS
BODY MASS INDEX: 34.33 KG/M2 | WEIGHT: 239.8 LBS | DIASTOLIC BLOOD PRESSURE: 85 MMHG | SYSTOLIC BLOOD PRESSURE: 132 MMHG | HEART RATE: 80 BPM | TEMPERATURE: 98.3 F | HEIGHT: 70 IN

## 2021-07-01 VITALS
TEMPERATURE: 98 F | BODY MASS INDEX: 34.69 KG/M2 | WEIGHT: 242.3 LBS | HEIGHT: 70 IN | SYSTOLIC BLOOD PRESSURE: 111 MMHG | DIASTOLIC BLOOD PRESSURE: 64 MMHG | HEART RATE: 92 BPM

## 2021-07-13 ENCOUNTER — OFFICE VISIT (OUTPATIENT)
Dept: ORTHOPEDIC SURGERY | Facility: CLINIC | Age: 36
End: 2021-07-13

## 2021-07-13 VITALS — BODY MASS INDEX: 35.7 KG/M2 | WEIGHT: 255 LBS | HEART RATE: 79 BPM | HEIGHT: 71 IN | OXYGEN SATURATION: 96 %

## 2021-07-13 DIAGNOSIS — Z47.89 AFTERCARE FOLLOWING SURGERY OF THE MUSCULOSKELETAL SYSTEM: Primary | ICD-10-CM

## 2021-07-13 PROCEDURE — 99212 OFFICE O/P EST SF 10 MIN: CPT | Performed by: PHYSICIAN ASSISTANT

## 2021-07-13 NOTE — PATIENT INSTRUCTIONS
Patient is to continue physical therapy until completion, work readiness.   Continue use of brace during work. Limitations, as discussed in clinic for work.   Follow up in 6 weeks to determine readiness to return full duty work.

## 2021-07-13 NOTE — PROGRESS NOTES
"Chief Complaint  Pain of the Right Knee    Subjective          Jake Flores presents to [facility] for s/p right knee ACL reconstruction on 03/03/21 by Dr. Laguna. Patient states he is still doing PT twice weekly, he states his ROM and strength are improving. 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 He states it is going well, with use of his brace. He states there is some swelling after he has been on his feet all day. Otherwise, he denies any pain.     Objective   Vital Signs:   Pulse 79   Ht 180.3 cm (71\")   Wt 116 kg (255 lb)   SpO2 96%   BMI 35.57 kg/m²       Physical Exam  Constitutional:       Appearance: Normal appearance. He is well-developed and normal weight.   HENT:      Head: Normocephalic.      Right Ear: Hearing and external ear normal.      Left Ear: Hearing and external ear normal.      Nose: Nose normal.   Eyes:      Conjunctiva/sclera: Conjunctivae normal.   Cardiovascular:      Rate and Rhythm: Normal rate.   Pulmonary:      Effort: Pulmonary effort is normal.      Breath sounds: No wheezing or rales.   Abdominal:      Palpations: Abdomen is soft.      Tenderness: There is no abdominal tenderness.   Musculoskeletal:      Cervical back: Normal range of motion.   Skin:     Findings: No rash.   Neurological:      Mental Status: He is alert and oriented to person, place, and time.   Psychiatric:         Mood and Affect: Mood and affect normal.         Judgment: Judgment normal.     Ortho Exam  Right knee: Well-healed scars.  No tenderness, atrophy or swelling.  Full weightbearing, gait is normal.  Active range of motion is 0-130 degrees flexion.  Stable to varus and valgus stress.  Negative Lachman's.  Good muscle tone of the quadriceps and hamstrings muscles.  Normal plantar and dorsiflexion.  Sensation is intact.  Neurovascular intact.  Calf is soft and nontender.  Result Review :            Imaging Results (Most Recent)     None              "   Assessment and Plan    Problem List Items Addressed This Visit        Musculoskeletal and Injuries    Aftercare following right knee ACL reconstruction  - Primary          Follow Up   Return in about 6 weeks (around 8/24/2021).  Patient Instructions   Patient is to continue physical therapy until completion, work readiness.   Continue use of brace during work. Limitations, as discussed in clinic for work.   Follow up in 6 weeks to determine readiness to return full duty work.     Patient was given instructions and counseling regarding his condition or for health maintenance advice. Please see specific information pulled into the AVS if appropriate.

## 2021-07-15 VITALS — HEIGHT: 71 IN | HEART RATE: 79 BPM | BODY MASS INDEX: 35.14 KG/M2 | OXYGEN SATURATION: 97 % | WEIGHT: 251 LBS

## 2021-08-24 ENCOUNTER — OFFICE VISIT (OUTPATIENT)
Dept: ORTHOPEDIC SURGERY | Facility: CLINIC | Age: 36
End: 2021-08-24

## 2021-08-24 VITALS — BODY MASS INDEX: 35 KG/M2 | WEIGHT: 250 LBS | HEART RATE: 98 BPM | HEIGHT: 71 IN | OXYGEN SATURATION: 98 %

## 2021-08-24 DIAGNOSIS — Z47.89 AFTERCARE FOLLOWING SURGERY OF THE MUSCULOSKELETAL SYSTEM: Primary | ICD-10-CM

## 2021-08-24 PROCEDURE — 99212 OFFICE O/P EST SF 10 MIN: CPT | Performed by: PHYSICIAN ASSISTANT

## 2021-08-24 NOTE — PATIENT INSTRUCTIONS
Patient able to return to full duty work. Recommend regular strengthening regimen of the quadriceps muscles.   Follow up on as needed basis.

## 2021-08-24 NOTE — PROGRESS NOTES
"Chief Complaint  Follow-up of the Right Knee    Subjective          Jake Flores presents to [facility] for S/P right knee ACL reconstruction on 03/03/21 by Dr. Laguna. 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 He states he finished therapy over the last month and has continued exercises at home.  Patient reports ROM and strength are doing well following completion of PT. He reports some soreness if he has been sitting for long periods then stands up.     Objective   Vital Signs:   Pulse 98   Ht 180.3 cm (71\")   Wt 113 kg (250 lb)   SpO2 98%   BMI 34.87 kg/m²       Physical Exam  Constitutional:       Appearance: Normal appearance. Patient is well-developed and normal weight.   HENT:      Head: Normocephalic.      Right Ear: Hearing and external ear normal.      Left Ear: Hearing and external ear normal.      Nose: Nose normal.   Eyes:      Conjunctiva/sclera: Conjunctivae normal.   Cardiovascular:      Rate and Rhythm: Normal rate.   Pulmonary:      Effort: Pulmonary effort is normal.      Breath sounds: No wheezing or rales.   Abdominal:      Palpations: Abdomen is soft.      Tenderness: There is no abdominal tenderness.   Musculoskeletal:      Cervical back: Normal range of motion.   Skin:     Findings: No rash.   Neurological:      Mental Status: Patient is alert and oriented to person, place, and time.   Psychiatric:         Mood and Affect: Mood and affect normal.         Judgment: Judgment normal.     Ortho Exam  Right knee: Well-healed surgical incision. No tenderness, atrophy, discoloration or deformity. Full weightbearing, normal gait. Sensation intact. Neurovascular intact. Posterior tibialis pulse 2+. Dorsalis pedis pulse 2+. Full AROM with knee flexion and extension. Stable to anterior drawer, varus and valgus stress. Well tracking patella. Good muscle tone of the quadriceps, hamstrings muscles. Normal plantar and dorsiflexion. Good " muscle tone of the ankle flexors.    Result Review :   The following data was reviewed by: ABBY Dasilva on 08/24/2021:         Imaging Results (Most Recent)     None                Assessment and Plan    Problem List Items Addressed This Visit        Musculoskeletal and Injuries    Aftercare following right knee ACL reconstruction  - Primary          Follow Up   Return if symptoms worsen or fail to improve.  Patient Instructions   Patient able to return to full duty work. Recommend regular strengthening regimen of the quadriceps muscles.   Follow up on as needed basis.     Patient was given instructions and counseling regarding his condition or for health maintenance advice. Please see specific information pulled into the AVS if appropriate.

## 2025-07-13 ENCOUNTER — HOSPITAL ENCOUNTER (OUTPATIENT)
Facility: HOSPITAL | Age: 40
Setting detail: OBSERVATION
Discharge: HOME | End: 2025-07-14
Attending: EMERGENCY MEDICINE | Admitting: EMERGENCY MEDICINE
Payer: COMMERCIAL

## 2025-07-13 ENCOUNTER — APPOINTMENT (OUTPATIENT)
Dept: CT IMAGING | Facility: HOSPITAL | Age: 40
End: 2025-07-13
Payer: COMMERCIAL

## 2025-07-13 DIAGNOSIS — D72.829 LEUKOCYTOSIS, UNSPECIFIED TYPE: ICD-10-CM

## 2025-07-13 DIAGNOSIS — N20.0 KIDNEY STONE: Primary | ICD-10-CM

## 2025-07-13 DIAGNOSIS — N17.9 ACUTE KIDNEY INJURY: ICD-10-CM

## 2025-07-13 PROBLEM — N20.1 URETERAL STONE: Status: ACTIVE | Noted: 2025-07-13

## 2025-07-13 LAB
ALBUMIN SERPL-MCNC: 4.4 G/DL (ref 3.5–5.2)
ALBUMIN/GLOB SERPL: 1.4 G/DL
ALP SERPL-CCNC: 86 U/L (ref 39–117)
ALT SERPL W P-5'-P-CCNC: 60 U/L (ref 1–41)
ANION GAP SERPL CALCULATED.3IONS-SCNC: 13.1 MMOL/L (ref 5–15)
AST SERPL-CCNC: 28 U/L (ref 1–40)
BACTERIA UR QL AUTO: ABNORMAL /HPF
BASOPHILS # BLD AUTO: 0.06 10*3/MM3 (ref 0–0.2)
BASOPHILS NFR BLD AUTO: 0.3 % (ref 0–1.5)
BILIRUB SERPL-MCNC: 0.3 MG/DL (ref 0–1.2)
BILIRUB UR QL STRIP: NEGATIVE
BUN SERPL-MCNC: 12 MG/DL (ref 6–20)
BUN/CREAT SERPL: 8.7 (ref 7–25)
CALCIUM SPEC-SCNC: 9.3 MG/DL (ref 8.6–10.5)
CHLORIDE SERPL-SCNC: 104 MMOL/L (ref 98–107)
CLARITY UR: CLEAR
CO2 SERPL-SCNC: 20.9 MMOL/L (ref 22–29)
COLOR UR: YELLOW
CREAT SERPL-MCNC: 1.38 MG/DL (ref 0.76–1.27)
DEPRECATED RDW RBC AUTO: 42.8 FL (ref 37–54)
EGFRCR SERPLBLD CKD-EPI 2021: 66.7 ML/MIN/1.73
EOSINOPHIL # BLD AUTO: 0.1 10*3/MM3 (ref 0–0.4)
EOSINOPHIL NFR BLD AUTO: 0.5 % (ref 0.3–6.2)
ERYTHROCYTE [DISTWIDTH] IN BLOOD BY AUTOMATED COUNT: 12.7 % (ref 12.3–15.4)
GLOBULIN UR ELPH-MCNC: 3.1 GM/DL
GLUCOSE SERPL-MCNC: 116 MG/DL (ref 65–99)
GLUCOSE UR STRIP-MCNC: NEGATIVE MG/DL
HCT VFR BLD AUTO: 45.9 % (ref 37.5–51)
HGB BLD-MCNC: 15.1 G/DL (ref 13–17.7)
HGB UR QL STRIP.AUTO: ABNORMAL
HYALINE CASTS UR QL AUTO: ABNORMAL /LPF
IMM GRANULOCYTES # BLD AUTO: 0.1 10*3/MM3 (ref 0–0.05)
IMM GRANULOCYTES NFR BLD AUTO: 0.5 % (ref 0–0.5)
INR PPP: 1.14 (ref 0.9–1.1)
KETONES UR QL STRIP: ABNORMAL
LEUKOCYTE ESTERASE UR QL STRIP.AUTO: NEGATIVE
LYMPHOCYTES # BLD AUTO: 1.33 10*3/MM3 (ref 0.7–3.1)
LYMPHOCYTES NFR BLD AUTO: 6.4 % (ref 19.6–45.3)
MAGNESIUM SERPL-MCNC: 1.7 MG/DL (ref 1.6–2.6)
MCH RBC QN AUTO: 30.4 PG (ref 26.6–33)
MCHC RBC AUTO-ENTMCNC: 32.9 G/DL (ref 31.5–35.7)
MCV RBC AUTO: 92.4 FL (ref 79–97)
MONOCYTES # BLD AUTO: 1.09 10*3/MM3 (ref 0.1–0.9)
MONOCYTES NFR BLD AUTO: 5.2 % (ref 5–12)
NEUTROPHILS NFR BLD AUTO: 18.1 10*3/MM3 (ref 1.7–7)
NEUTROPHILS NFR BLD AUTO: 87.1 % (ref 42.7–76)
NITRITE UR QL STRIP: NEGATIVE
NRBC BLD AUTO-RTO: 0 /100 WBC (ref 0–0.2)
PH UR STRIP.AUTO: 6 [PH] (ref 5–8)
PLATELET # BLD AUTO: 298 10*3/MM3 (ref 140–450)
PMV BLD AUTO: 9.2 FL (ref 6–12)
POTASSIUM SERPL-SCNC: 4 MMOL/L (ref 3.5–5.2)
PROCALCITONIN SERPL-MCNC: 0.11 NG/ML (ref 0–0.25)
PROT SERPL-MCNC: 7.5 G/DL (ref 6–8.5)
PROT UR QL STRIP: NEGATIVE
PROTHROMBIN TIME: 14.5 SECONDS (ref 11.7–14.2)
RBC # BLD AUTO: 4.97 10*6/MM3 (ref 4.14–5.8)
RBC # UR STRIP: ABNORMAL /HPF
REF LAB TEST METHOD: ABNORMAL
SODIUM SERPL-SCNC: 138 MMOL/L (ref 136–145)
SP GR UR STRIP: 1.03 (ref 1–1.03)
SQUAMOUS #/AREA URNS HPF: ABNORMAL /HPF
UROBILINOGEN UR QL STRIP: ABNORMAL
WBC # UR STRIP: ABNORMAL /HPF
WBC NRBC COR # BLD AUTO: 20.78 10*3/MM3 (ref 3.4–10.8)

## 2025-07-13 PROCEDURE — 25010000002 DROPERIDOL PER 5 MG: Performed by: EMERGENCY MEDICINE

## 2025-07-13 PROCEDURE — 25810000003 SODIUM CHLORIDE 0.9 % SOLUTION: Performed by: EMERGENCY MEDICINE

## 2025-07-13 PROCEDURE — G0378 HOSPITAL OBSERVATION PER HR: HCPCS

## 2025-07-13 PROCEDURE — 85610 PROTHROMBIN TIME: CPT | Performed by: EMERGENCY MEDICINE

## 2025-07-13 PROCEDURE — 96375 TX/PRO/DX INJ NEW DRUG ADDON: CPT

## 2025-07-13 PROCEDURE — 25010000002 CEFTRIAXONE PER 250 MG: Performed by: EMERGENCY MEDICINE

## 2025-07-13 PROCEDURE — 83735 ASSAY OF MAGNESIUM: CPT | Performed by: EMERGENCY MEDICINE

## 2025-07-13 PROCEDURE — 51798 US URINE CAPACITY MEASURE: CPT

## 2025-07-13 PROCEDURE — 74177 CT ABD & PELVIS W/CONTRAST: CPT

## 2025-07-13 PROCEDURE — 25010000002 LIDOCAINE 1 % SOLUTION 50 ML VIAL

## 2025-07-13 PROCEDURE — 25010000002 DIPHENHYDRAMINE PER 50 MG: Performed by: EMERGENCY MEDICINE

## 2025-07-13 PROCEDURE — 96374 THER/PROPH/DIAG INJ IV PUSH: CPT

## 2025-07-13 PROCEDURE — 84145 PROCALCITONIN (PCT): CPT | Performed by: EMERGENCY MEDICINE

## 2025-07-13 PROCEDURE — 99285 EMERGENCY DEPT VISIT HI MDM: CPT

## 2025-07-13 PROCEDURE — 25510000001 IOPAMIDOL 61 % SOLUTION: Performed by: EMERGENCY MEDICINE

## 2025-07-13 PROCEDURE — 81001 URINALYSIS AUTO W/SCOPE: CPT | Performed by: EMERGENCY MEDICINE

## 2025-07-13 PROCEDURE — 80053 COMPREHEN METABOLIC PANEL: CPT | Performed by: EMERGENCY MEDICINE

## 2025-07-13 PROCEDURE — 25810000003 SODIUM CHLORIDE 0.9 % SOLUTION 250 ML FLEX CONT

## 2025-07-13 PROCEDURE — 85025 COMPLETE CBC W/AUTO DIFF WBC: CPT | Performed by: EMERGENCY MEDICINE

## 2025-07-13 RX ORDER — AMOXICILLIN 250 MG
2 CAPSULE ORAL 2 TIMES DAILY PRN
Status: DISCONTINUED | OUTPATIENT
Start: 2025-07-13 | End: 2025-07-14 | Stop reason: HOSPADM

## 2025-07-13 RX ORDER — MORPHINE SULFATE 2 MG/ML
4 INJECTION, SOLUTION INTRAMUSCULAR; INTRAVENOUS EVERY 4 HOURS PRN
Status: DISCONTINUED | OUTPATIENT
Start: 2025-07-13 | End: 2025-07-13

## 2025-07-13 RX ORDER — SODIUM CHLORIDE 9 MG/ML
40 INJECTION, SOLUTION INTRAVENOUS AS NEEDED
Status: DISCONTINUED | OUTPATIENT
Start: 2025-07-13 | End: 2025-07-14 | Stop reason: HOSPADM

## 2025-07-13 RX ORDER — DIPHENHYDRAMINE HYDROCHLORIDE 50 MG/ML
25 INJECTION, SOLUTION INTRAMUSCULAR; INTRAVENOUS ONCE
Status: COMPLETED | OUTPATIENT
Start: 2025-07-13 | End: 2025-07-13

## 2025-07-13 RX ORDER — POLYETHYLENE GLYCOL 3350 17 G/17G
17 POWDER, FOR SOLUTION ORAL DAILY PRN
Status: DISCONTINUED | OUTPATIENT
Start: 2025-07-13 | End: 2025-07-14 | Stop reason: HOSPADM

## 2025-07-13 RX ORDER — SODIUM CHLORIDE 0.9 % (FLUSH) 0.9 %
10 SYRINGE (ML) INJECTION AS NEEDED
Status: DISCONTINUED | OUTPATIENT
Start: 2025-07-13 | End: 2025-07-14 | Stop reason: HOSPADM

## 2025-07-13 RX ORDER — DROPERIDOL 2.5 MG/ML
1.25 INJECTION, SOLUTION INTRAMUSCULAR; INTRAVENOUS ONCE
Status: COMPLETED | OUTPATIENT
Start: 2025-07-13 | End: 2025-07-13

## 2025-07-13 RX ORDER — IOPAMIDOL 612 MG/ML
85 INJECTION, SOLUTION INTRAVASCULAR
Status: COMPLETED | OUTPATIENT
Start: 2025-07-13 | End: 2025-07-13

## 2025-07-13 RX ORDER — SODIUM CHLORIDE 0.9 % (FLUSH) 0.9 %
10 SYRINGE (ML) INJECTION EVERY 12 HOURS SCHEDULED
Status: DISCONTINUED | OUTPATIENT
Start: 2025-07-13 | End: 2025-07-14 | Stop reason: HOSPADM

## 2025-07-13 RX ORDER — ACETAMINOPHEN 500 MG
1000 TABLET ORAL EVERY 8 HOURS PRN
Status: DISCONTINUED | OUTPATIENT
Start: 2025-07-13 | End: 2025-07-14 | Stop reason: HOSPADM

## 2025-07-13 RX ORDER — ONDANSETRON 2 MG/ML
4 INJECTION INTRAMUSCULAR; INTRAVENOUS EVERY 6 HOURS PRN
Status: DISCONTINUED | OUTPATIENT
Start: 2025-07-13 | End: 2025-07-14 | Stop reason: HOSPADM

## 2025-07-13 RX ORDER — BISACODYL 10 MG
10 SUPPOSITORY, RECTAL RECTAL DAILY PRN
Status: DISCONTINUED | OUTPATIENT
Start: 2025-07-13 | End: 2025-07-14 | Stop reason: HOSPADM

## 2025-07-13 RX ORDER — BISACODYL 5 MG/1
5 TABLET, DELAYED RELEASE ORAL DAILY PRN
Status: DISCONTINUED | OUTPATIENT
Start: 2025-07-13 | End: 2025-07-14 | Stop reason: HOSPADM

## 2025-07-13 RX ADMIN — SODIUM CHLORIDE 1000 ML: 9 INJECTION, SOLUTION INTRAVENOUS at 20:48

## 2025-07-13 RX ADMIN — DIPHENHYDRAMINE HYDROCHLORIDE 25 MG: 50 INJECTION INTRAMUSCULAR; INTRAVENOUS at 20:48

## 2025-07-13 RX ADMIN — CEFTRIAXONE 2000 MG: 2 INJECTION, POWDER, FOR SOLUTION INTRAMUSCULAR; INTRAVENOUS at 22:59

## 2025-07-13 RX ADMIN — DROPERIDOL 1.25 MG: 2.5 INJECTION, SOLUTION INTRAMUSCULAR; INTRAVENOUS at 20:48

## 2025-07-13 RX ADMIN — SODIUM CHLORIDE 150 MG: 9 INJECTION, SOLUTION INTRAVENOUS at 23:51

## 2025-07-13 RX ADMIN — ACETAMINOPHEN 1000 MG: 500 TABLET ORAL at 23:51

## 2025-07-13 RX ADMIN — SODIUM CHLORIDE 1000 ML: 9 INJECTION, SOLUTION INTRAVENOUS at 22:54

## 2025-07-13 RX ADMIN — IOPAMIDOL 85 ML: 612 INJECTION, SOLUTION INTRAVENOUS at 22:01

## 2025-07-13 RX ADMIN — Medication 10 ML: at 23:51

## 2025-07-14 VITALS
SYSTOLIC BLOOD PRESSURE: 122 MMHG | DIASTOLIC BLOOD PRESSURE: 83 MMHG | WEIGHT: 260 LBS | TEMPERATURE: 97.8 F | RESPIRATION RATE: 16 BRPM | OXYGEN SATURATION: 94 % | HEART RATE: 89 BPM | BODY MASS INDEX: 36.4 KG/M2 | HEIGHT: 71 IN

## 2025-07-14 LAB
ALBUMIN SERPL-MCNC: 3.9 G/DL (ref 3.5–5.2)
ALBUMIN/GLOB SERPL: 1.4 G/DL
ALP SERPL-CCNC: 73 U/L (ref 39–117)
ALT SERPL W P-5'-P-CCNC: 46 U/L (ref 1–41)
ANION GAP SERPL CALCULATED.3IONS-SCNC: 12 MMOL/L (ref 5–15)
AST SERPL-CCNC: 28 U/L (ref 1–40)
BASOPHILS # BLD AUTO: 0.03 10*3/MM3 (ref 0–0.2)
BASOPHILS NFR BLD AUTO: 0.2 % (ref 0–1.5)
BILIRUB SERPL-MCNC: 0.4 MG/DL (ref 0–1.2)
BUN SERPL-MCNC: 11 MG/DL (ref 6–20)
BUN/CREAT SERPL: 8.9 (ref 7–25)
CALCIUM SPEC-SCNC: 8.8 MG/DL (ref 8.6–10.5)
CHLORIDE SERPL-SCNC: 106 MMOL/L (ref 98–107)
CO2 SERPL-SCNC: 19 MMOL/L (ref 22–29)
CREAT SERPL-MCNC: 1.24 MG/DL (ref 0.76–1.27)
DEPRECATED RDW RBC AUTO: 43.7 FL (ref 37–54)
EGFRCR SERPLBLD CKD-EPI 2021: 75.8 ML/MIN/1.73
EOSINOPHIL # BLD AUTO: 0.01 10*3/MM3 (ref 0–0.4)
EOSINOPHIL NFR BLD AUTO: 0.1 % (ref 0.3–6.2)
ERYTHROCYTE [DISTWIDTH] IN BLOOD BY AUTOMATED COUNT: 12.7 % (ref 12.3–15.4)
GLOBULIN UR ELPH-MCNC: 2.8 GM/DL
GLUCOSE SERPL-MCNC: 116 MG/DL (ref 65–99)
HCT VFR BLD AUTO: 45.5 % (ref 37.5–51)
HGB BLD-MCNC: 15.3 G/DL (ref 13–17.7)
IMM GRANULOCYTES # BLD AUTO: 0.06 10*3/MM3 (ref 0–0.05)
IMM GRANULOCYTES NFR BLD AUTO: 0.4 % (ref 0–0.5)
INR PPP: 1.11 (ref 0.9–1.1)
LYMPHOCYTES # BLD AUTO: 1.37 10*3/MM3 (ref 0.7–3.1)
LYMPHOCYTES NFR BLD AUTO: 8.2 % (ref 19.6–45.3)
MCH RBC QN AUTO: 31.2 PG (ref 26.6–33)
MCHC RBC AUTO-ENTMCNC: 33.6 G/DL (ref 31.5–35.7)
MCV RBC AUTO: 92.9 FL (ref 79–97)
MONOCYTES # BLD AUTO: 1.03 10*3/MM3 (ref 0.1–0.9)
MONOCYTES NFR BLD AUTO: 6.2 % (ref 5–12)
NEUTROPHILS NFR BLD AUTO: 14.18 10*3/MM3 (ref 1.7–7)
NEUTROPHILS NFR BLD AUTO: 84.9 % (ref 42.7–76)
NRBC BLD AUTO-RTO: 0 /100 WBC (ref 0–0.2)
PLATELET # BLD AUTO: 274 10*3/MM3 (ref 140–450)
PMV BLD AUTO: 9.8 FL (ref 6–12)
POTASSIUM SERPL-SCNC: 4 MMOL/L (ref 3.5–5.2)
PROT SERPL-MCNC: 6.7 G/DL (ref 6–8.5)
PROTHROMBIN TIME: 14.3 SECONDS (ref 11.7–14.2)
RBC # BLD AUTO: 4.9 10*6/MM3 (ref 4.14–5.8)
SODIUM SERPL-SCNC: 137 MMOL/L (ref 136–145)
WBC NRBC COR # BLD AUTO: 16.68 10*3/MM3 (ref 3.4–10.8)

## 2025-07-14 PROCEDURE — 85610 PROTHROMBIN TIME: CPT

## 2025-07-14 PROCEDURE — G0378 HOSPITAL OBSERVATION PER HR: HCPCS

## 2025-07-14 PROCEDURE — 80053 COMPREHEN METABOLIC PANEL: CPT

## 2025-07-14 PROCEDURE — 85025 COMPLETE CBC W/AUTO DIFF WBC: CPT

## 2025-07-14 RX ORDER — HYDROCODONE BITARTRATE AND ACETAMINOPHEN 5; 325 MG/1; MG/1
1 TABLET ORAL EVERY 6 HOURS PRN
Qty: 10 TABLET | Refills: 0 | Status: SHIPPED | OUTPATIENT
Start: 2025-07-14

## 2025-07-14 RX ORDER — ONDANSETRON 4 MG/1
4 TABLET, ORALLY DISINTEGRATING ORAL EVERY 8 HOURS PRN
Qty: 30 TABLET | Refills: 0 | Status: SHIPPED | OUTPATIENT
Start: 2025-07-14

## 2025-07-14 RX ORDER — CEPHALEXIN 500 MG/1
500 CAPSULE ORAL 2 TIMES DAILY
Qty: 10 CAPSULE | Refills: 0 | Status: SHIPPED | OUTPATIENT
Start: 2025-07-14

## 2025-07-14 RX ADMIN — Medication 10 ML: at 10:17

## 2025-07-14 NOTE — ED PROVIDER NOTES
EMERGENCY DEPARTMENT ENCOUNTER    Room Number:  32/32  Date of encounter:  7/13/2025  PCP: Pat Ny PA-C  Historian: Patient and significant other  Relevant information and history provided by sources other than the patient will be included below and in the ED Course.  Review of pertinent past medical records may also be included in record below and ED Course.    HPI:  Chief Complaint: Left flank pain and hematuria  A complete HPI/ROS/PMH/PSH/SH/FH are unobtainable due to: Not applicable  Context: Jake Flores is a 40 y.o. male who presents to the ED c/o this gentlemen's symptoms started on Tuesday.  Today it is Sunday.  Initially had some left flank pain that was sharp hit suddenly and then resolved.  And then since then has had this mild pain that comes and goes.  He has noticed some blood in his urine.  He went to the urgent care center on Friday they did some blood work and a urinalysis.  He has not heard back from the blood work or the urinalysis.  He came today because the pain was more constant and worse and is developed some nausea and vomiting.  He believes this is a kidney stone.  He had a kidney stone about 15 years ago.  The pain is in the left flank it radiates all the way down to just above his left testicle.  He has had no fevers or chills.  No chest pain or shortness of breath.  He has had inguinal hernia surgery in the past he is also had a stent in the past for his previous kidney stone about 15 years ago.  Currently he is just dribbling urine feels like he needs to urinate but unable to.  Concerned he might be retaining urine        Previous Episodes: Yes he believes his kidney stone  Current Symptoms: See above    MEDICAL HISTORY REVIEWED  This gentleman has a history of opiate abuse and has been drug-free for approximately 6 years he is adamant that he does not want any narcotics.  History of kidney stone in the past.      PAST MEDICAL HISTORY  Active Ambulatory Problems     Diagnosis  Date Noted    Aftercare following right knee ACL reconstruction  07/13/2021     Resolved Ambulatory Problems     Diagnosis Date Noted    No Resolved Ambulatory Problems     No Additional Past Medical History         PAST SURGICAL HISTORY  Past Surgical History:   Procedure Laterality Date    EYE SURGERY      eye implant, yes     HERNIA REPAIR      KIDNEY SURGERY      KNEE SURGERY           FAMILY HISTORY  Family History   Problem Relation Age of Onset    Diabetes Mother     Heart disease Father     Diabetes Father          SOCIAL HISTORY  Social History     Socioeconomic History    Marital status:    Tobacco Use    Smoking status: Every Day     Current packs/day: 1.00     Types: Cigarettes    Smokeless tobacco: Never    Tobacco comments:     smoked 6-10 years    Substance and Sexual Activity    Alcohol use: Never    Drug use: Never         ALLERGIES  Patient has no known allergies.        REVIEW OF SYSTEMS  Review of Systems     All systems reviewed and negative except for those discussed in HPI.       PHYSICAL EXAM    I have reviewed the triage vital signs and nursing notes.    ED Triage Vitals   Temp Heart Rate Resp BP SpO2   07/13/25 1952 07/13/25 1952 07/13/25 1952 07/13/25 1954 07/13/25 1952   98.2 °F (36.8 °C) 92 18 159/96 99 %      Temp src Heart Rate Source Patient Position BP Location FiO2 (%)   07/13/25 1952 07/13/25 1952 07/13/25 1954 07/13/25 1954 --   Oral Monitor Sitting Right arm        GENERAL: This gentleman looks uncomfortable. Vital signs on my initial evaluation have been reviewed  HENT: nares patent  Head/neck/ face are symmetric without gross deformity, signs of trauma, or swelling  EYES: no scleral icterus, no conjunctival pallor.  NECK: Supple, no meningismus  CV: regular rhythm, regular rate with intact distal pulses.  RESPIRATORY: normal effort and no respiratory distress.  ABDOMEN: soft and obese.  Location of his pain is in his left flank radiates to the left lower quadrant and  left inguinal area.  Normal inspection.  His belly is soft.  Pain is not really reproducible with palpation.  Normal bowel sounds.  MUSCULOSKELETAL: no deformity.  No edema.  Intact distal pulses that are equal strong and symmetric.  No coolness or cyanosis.  NEURO: alert and appropriate, moves all extremities, follows commands.  No focal motor or sensory changes.  SKIN: warm, dry    Vital signs and nursing notes reviewed.        LAB RESULTS  Recent Results (from the past 24 hours)   Comprehensive Metabolic Panel    Collection Time: 07/13/25  8:41 PM    Specimen: Blood   Result Value Ref Range    Glucose 116 (H) 65 - 99 mg/dL    BUN 12.0 6.0 - 20.0 mg/dL    Creatinine 1.38 (H) 0.76 - 1.27 mg/dL    Sodium 138 136 - 145 mmol/L    Potassium 4.0 3.5 - 5.2 mmol/L    Chloride 104 98 - 107 mmol/L    CO2 20.9 (L) 22.0 - 29.0 mmol/L    Calcium 9.3 8.6 - 10.5 mg/dL    Total Protein 7.5 6.0 - 8.5 g/dL    Albumin 4.4 3.5 - 5.2 g/dL    ALT (SGPT) 60 (H) 1 - 41 U/L    AST (SGOT) 28 1 - 40 U/L    Alkaline Phosphatase 86 39 - 117 U/L    Total Bilirubin 0.3 0.0 - 1.2 mg/dL    Globulin 3.1 gm/dL    A/G Ratio 1.4 g/dL    BUN/Creatinine Ratio 8.7 7.0 - 25.0    Anion Gap 13.1 5.0 - 15.0 mmol/L    eGFR 66.7 >60.0 mL/min/1.73   Protime-INR    Collection Time: 07/13/25  8:41 PM    Specimen: Blood   Result Value Ref Range    Protime 14.5 (H) 11.7 - 14.2 Seconds    INR 1.14 (H) 0.90 - 1.10   Magnesium    Collection Time: 07/13/25  8:41 PM    Specimen: Blood   Result Value Ref Range    Magnesium 1.7 1.6 - 2.6 mg/dL   CBC Auto Differential    Collection Time: 07/13/25  8:41 PM    Specimen: Blood   Result Value Ref Range    WBC 20.78 (H) 3.40 - 10.80 10*3/mm3    RBC 4.97 4.14 - 5.80 10*6/mm3    Hemoglobin 15.1 13.0 - 17.7 g/dL    Hematocrit 45.9 37.5 - 51.0 %    MCV 92.4 79.0 - 97.0 fL    MCH 30.4 26.6 - 33.0 pg    MCHC 32.9 31.5 - 35.7 g/dL    RDW 12.7 12.3 - 15.4 %    RDW-SD 42.8 37.0 - 54.0 fl    MPV 9.2 6.0 - 12.0 fL    Platelets 298 140 -  450 10*3/mm3    Neutrophil % 87.1 (H) 42.7 - 76.0 %    Lymphocyte % 6.4 (L) 19.6 - 45.3 %    Monocyte % 5.2 5.0 - 12.0 %    Eosinophil % 0.5 0.3 - 6.2 %    Basophil % 0.3 0.0 - 1.5 %    Immature Grans % 0.5 0.0 - 0.5 %    Neutrophils, Absolute 18.10 (H) 1.70 - 7.00 10*3/mm3    Lymphocytes, Absolute 1.33 0.70 - 3.10 10*3/mm3    Monocytes, Absolute 1.09 (H) 0.10 - 0.90 10*3/mm3    Eosinophils, Absolute 0.10 0.00 - 0.40 10*3/mm3    Basophils, Absolute 0.06 0.00 - 0.20 10*3/mm3    Immature Grans, Absolute 0.10 (H) 0.00 - 0.05 10*3/mm3    nRBC 0.0 0.0 - 0.2 /100 WBC   Procalcitonin    Collection Time: 07/13/25  8:41 PM    Specimen: Blood   Result Value Ref Range    Procalcitonin 0.11 0.00 - 0.25 ng/mL   Urinalysis With Microscopic If Indicated (No Culture) - Urine, Clean Catch    Collection Time: 07/13/25 10:53 PM    Specimen: Urine, Clean Catch   Result Value Ref Range    Color, UA Yellow Yellow, Straw    Appearance, UA Clear Clear    pH, UA 6.0 5.0 - 8.0    Specific Gravity, UA 1.028 1.005 - 1.030    Glucose, UA Negative Negative    Ketones, UA 15 mg/dL (1+) (A) Negative    Bilirubin, UA Negative Negative    Blood, UA Large (3+) (A) Negative    Protein, UA Negative Negative    Leuk Esterase, UA Negative Negative    Nitrite, UA Negative Negative    Urobilinogen, UA 0.2 E.U./dL 0.2 - 1.0 E.U./dL   Urinalysis, Microscopic Only - Urine, Clean Catch    Collection Time: 07/13/25 10:53 PM    Specimen: Urine, Clean Catch   Result Value Ref Range    RBC, UA Too Numerous to Count (A) None Seen, 0-2 /HPF    WBC, UA 0-2 None Seen, 0-2 /HPF    Bacteria, UA None Seen None Seen /HPF    Squamous Epithelial Cells, UA 0-2 None Seen, 0-2 /HPF    Hyaline Casts, UA None Seen None Seen /LPF    Methodology Automated Microscopy        Ordered the above labs and independently reviewed the results.        RADIOLOGY  CT Abdomen Pelvis With Contrast  Result Date: 7/13/2025  CT  ABDOMEN & PELVIS WITH IV CONTRAST  HISTORY: Left flank pain.   TECHNIQUE: CT of the abdomen and pelvis with  IV contrast. Coronal and sagittal reconstructions were obtained.  Radiation dose reduction techniques were utilized, including automated exposure control, and exposure modulation based on body size.  COMPARISON: None available.  FINDINGS:  Lung bases: Visualized lung bases are unremarkable.  Abdomen: There is mild fatty infiltration of the liver, but no hepatic mass or biliary obstruction. The gallbladder appears normal. The spleen and pancreas appear normal.  Both adrenal glands are normal. There is no evidence of bowel obstruction. The aorta is normal in caliber.   : The right kidney appears normal. Left kidney shows a delay in contrast enhancement, perinephric stranding, and moderate hydronephrosis. This appears secondary to an approximately 8 mm stone in the upper left ureter at or just beyond the ureteropelvic junction. No additional or more distal ureteral calculi are seen. No bladder calculi are seen.  Pelvis:  The appendix is clearly identified, and is normal.  There is no pelvic inflammatory change or other abnormal fluid collection.  There is no pelvic adenopathy or other soft tissue mass.  There is no CT evidence of hernia or bowel obstruction.  There is no acute bony abnormality.       Mild to moderate left hydronephrosis secondary to an approximately 8 mm stone in the upper left ureter, at or just beyond the ureteropelvic junction.  Fatty infiltration of the liver.     This report was finalized on 7/13/2025 11:04 PM by Dr. Mata Fernandes M.D on Workstation: [id number]        I ordered the above noted radiological studies. Reviewed by me and discussed with radiologist.  See dictation for official radiology interpretation.      PROCEDURES    Procedures      MEDICATIONS GIVEN IN ER    Medications   sodium chloride 0.9 % flush 10 mL (has no administration in time range)   sodium chloride 0.9 % bolus 1,000 mL (1,000 mL Intravenous New Bag 7/13/25 5853)    cefTRIAXone (ROCEPHIN) 2,000 mg in sodium chloride 0.9 % 100 mL MBP (2,000 mg Intravenous New Bag 7/13/25 2259)   sodium chloride 0.9 % bolus 1,000 mL (0 mL Intravenous Stopped 7/13/25 2253)   droperidol (INAPSINE) injection 1.25 mg (1.25 mg Intravenous Given 7/13/25 2048)   diphenhydrAMINE (BENADRYL) injection 25 mg (25 mg Intravenous Given 7/13/25 2048)   iopamidol (ISOVUE-300) 61 % injection 85 mL (85 mL Intravenous Given 7/13/25 2201)         All labs have been independently reviewed by me.  All radiology studies have been reviewed by me and I discussed with radiologist dictating the report when indicated below.  All EKG's independently viewed and interpreted by me.  Discussion below represents my analysis of pertinent findings related to patient's condition, differential diagnosis, treatment plan and final disposition.        PROGRESS, DATA ANALYSIS, CONSULTS, AND MEDICAL DECISION MAKING    Differential diagnosis includes   - hepatobiliary pathology such as cholecystitis, cholangitis, and symptomatic cholelithiasis  -PUD  -Mesenteric ischemia  - Pancreatitis  - Dyspepsia  - Small bowel or large bowel obstruction  - Appendicitis  - Diverticulitis  - UTI including pyelonephritis  - Ureteral stone  - Zoster  - Colitis, including infectious and ischemic  - Atypical ACS  This is a patient to anticipate probably has a kidney stone.  He adamantly does not want any opiate pain medicine.  He also has nausea.  I am going to treat him with IV droperidol and IV Benadryl and IV fluids.  Informed him of the test that we will order and my clinical concerns.  All questions answered.  Agrees with the treatment plan.      ED Course as of 07/13/25 2309   Sun Jul 13, 2025 2108 WBC(!): 20.78 [MM]   2108 He does not report any fever with that website of 20,000.  He has had several bouts of vomiting just prior to arrival in the emergency department.  Very well could be reactive. [MM]   2144 Creatinine(!): 1.38 [MM]   2157  Patient's pain has improved he still has some testicular pain.  He can tolerate the pain he does not want any narcotics again. [MM]   2211 Bladder scanned him and it was 10 cc.  This gentleman has not been able to generate a urine.  He has had multiple bouts of vomiting prior to arrival here.  We are giving him a fluid bolus. [MM]   2237 With patient and spouse at length informed them about the results of the test and my interpretation of the CT scan.  I am waiting for the radiologist to give final interpretation of the CT scan.  He still having some discomfort but is comfortable and does not want anything more at this time.  All questions answered [MM]   2237 My own independent interpretation of the CT scan shows a proximal left ureter stone with significant hydronephrosis that is approximately 5 to 6 mm.  Awaiting for radiologist interpretation. [MM]   2238 Discussed the case with Elizabeth in the observation unit.  Informed her of the presenting symptoms results of the tests and my clinical concerns.  She agrees to admit. [MM]   2252 I did discuss the case with the urologist Dr. Bhatia and he will consult.  Wants the patient n.p.o. after midnight. [MM]   2308 I reviewed the CT scan report from the radiologist there is mild to moderate left hydronephrosis secondary to a proximal approximately an 8 mm stone in the upper left ureter just beyond the ureteropelvic junction.  Please see complete dictated report from radiologist [MM]      ED Course User Index  [MM] Vasiliy Ledbetter MD       AS OF 23:09 EDT VITALS:    BP - 178/94  HR - 81  TEMP - 98.2 °F (36.8 °C) (Oral)  02 SATS - 98%    SOCIAL DETERMINANTS OF HEALTH THAT IMPACT OR LIMIT CARE (For example..Homelessness,safe discharge, inability to obtain care, follow up, or prescriptions):      DIAGNOSIS  Final diagnoses:   Kidney stone   Acute kidney injury   Leukocytosis, unspecified type         DISPOSITION  Patient admitted to the observation unit.          DICTATED  UTILIZING DRAGON DICTATION    Note Disclaimer: At [facility], we believe that sharing information builds trust and better relationships. You are receiving this note because you recently visited [facility]. It is possible you will see health information before a provider has talked with you about it. This kind of information can be easy to misunderstand. To help you fully understand what it means for your health, we urge you to discuss this note with your provider.       Vasiliy Ledbetter MD  07/13/25 1742

## 2025-07-14 NOTE — H&P
[facility]   HISTORY AND PHYSICAL    Patient Name: Jake Flores  : 1985  MRN: [id number]  Primary Care Physician:  Pat Ny PA-C  Date of admission: 2025    Subjective   Subjective     Chief Complaint:   Chief Complaint   Patient presents with    Flank Pain    Urinary Retention         HPI:    Jake Flores is a 40 y.o. male with medical history significant for kidney stones presents with nausea and left-sided flank pain.  CT abdomen pelvis show a 8 mm proximal left ureteral stone with mild to moderate hydronephrosis.  He reports in the past he has had kidney stones always require urological procedures to retrieve them.  He denies fever, diarrhea.     He reports he has a history of substance abuse and prefers to avoid opioid analgesics.    Review of Systems   All systems were reviewed and negative except for: Left-sided flank pain, nausea      Personal History     History reviewed. No pertinent past medical history.    Past Surgical History:   Procedure Laterality Date    EYE SURGERY      eye implant, yes     HERNIA REPAIR      KIDNEY SURGERY      KNEE SURGERY         Family History: family history includes Diabetes in his father and mother; Heart disease in his father. Otherwise pertinent FHx was reviewed and not pertinent to current issue.    Social History:  reports that he has been smoking. He has never used smokeless tobacco. He reports that he does not drink alcohol and does not use drugs.    Home Medications:       Allergies:  No Known Allergies    Objective   Objective     Vitals:   Temp:  [98.2 °F (36.8 °C)] 98.2 °F (36.8 °C)  Heart Rate:  [81-95] 81  Resp:  [18] 18  BP: (159-178)/(94-96) 178/94   Physical Exam:     Constitutional: Awake, alert. Well developed for age. Nontoxic appearing.   Eyes: PERRL x2, sclerae anicteric, no conjunctival injection. No EOM abnormalities.   HENT: NCAT, mucous membranes moist,   Neck: Supple, no thyromegaly, no lymphadenopathy, trachea  midline  Respiratory: Clear to auscultation bilaterally, nonlabored respirations   Cardiovascular: RRR, no murmurs, rubs, or gallops, palpable pedal pulses bilaterally. No appreciable edema.   Gastrointestinal: Positive bowel sounds, soft, nontender, not distended.   Musculoskeletal: No bilateral ankle edema, no clubbing or cyanosis to extremities. No obvious deformities.   Psychiatric: Appropriate affect, cooperative. Converses appropriately for age.   Neurologic: Oriented x 3, strength symmetric in all extremities. Cranial nerves grossly intact to confrontation, speech clear  Skin: No rashes, skin intact.     Result Review    Result Review:  I have personally reviewed the results from the time of this admission to 7/13/2025 23:14 EDT and agree with these findings:  [x]  Laboratory list / accordion  []  Microbiology  [x]  Radiology  []  EKG/Telemetry   []  Cardiology/Vascular   []  Pathology  []  Old records  []  Other:  Most notable findings include:     CT A/P shows an 8 mm proximal left ureteral stone with mild to moderate hydronephrosis.  Serum creatinine is 1.38  UA is nitrite negative, positive for 1+ ketone, 3+ blood.  WBC is 20.78.  He is afebrile.    Assessment & Plan   Assessment / Plan     Brief Patient Summary:  Jaek Flores is a 40 y.o. male who presents with left-sided flank pain found to have 8 mm ureteral stone with mild to moderate hydronephrosis.    Active Hospital Problems:  Active Hospital Problems    Diagnosis     **Ureteral stone      Plan:     Left ureteral stone  Left flank pain  - CTAP shows 8 mm left ureteral stone with mild to moderate hydronephrosis  - Serum creatinine 1.38  - Zofran as needed  - Lidocaine infusion  - Avoid opioids per patient request  - Urology consult      VTE Prophylaxis:  Mechanical VTE prophylaxis orders are present.        CODE STATUS: Full code     Admission Status:  I believe this patient meets observation status.    Electronically signed by JESS Khan,  07/13/25, 11:14 PM EDT.        80 minutes has been spent by [facility] providers in the care of this patient while under observation status on this date 07/13/25      I have worn appropriate PPE during this patient encounter, sanitized my hands both with entering and exiting patient's room.    I have discussed plan of care with patient including advance care plan and/or surrogate decision maker.  Patient advises that their spouse, Pat Cole, will be their primary surrogate decision maker

## 2025-07-14 NOTE — PROGRESS NOTES
Patient Care Team:  Pat Ny PA-C as PCP - General (Physician Assistant)     SUN FARFAN H&P Note    I supervised care provided by the midlevel provider. We have discussed this patient's history, physical exam, and treatment plan. I have reviewed the midlevel provider's note and I agree with the midlevel provider's findings and plan of care.   SHARED VISIT: This visit was performed by BOTH a physician and an APC. The substantive portion of the medical decision making was performed by this attesting physician who made or approved the management plan and takes responsibility for patient management.   I have personally had a face to face encounter with the patient.   My personal findings are documented below:    History:  40-year-old male with past medical history of kidney stones presents with left flank pain and nausea.    Physical Exam:  General: No acute distress.  HENT: NCAT, PERRL, Nares patent.  Eyes: no scleral icterus.  Neck: trachea midline, no ROM limitations.  CV: regular rhythm, regular rate.  Respiratory: normal effort, CTAB.  Abdomen: soft, nondistended, NTTP, no rebound tenderness, no guarding or rigidity.  Musculoskeletal: no deformity.  Neuro: alert, moves all extremities, follows commands.  Skin: warm, dry.    Assessment and Plan:  40-year-old male with past medical history including kidney stones presents with left flank and nausea, CT imaging shows 8 mm proximal left ureteral stone with mild-moderate hydronephrosis. Leukocytosis with white blood cell count of 20.78, urinalysis showed no sign of urinary tract infection, creatinine elevated 1.38, glucose 116.  Patient admitted to the observation unit, urology consulted, treating pain, avoiding opiates per patient request, antiemetics, IV fluids, trending labs, monitoring.

## 2025-07-14 NOTE — ED NOTES
"Nursing report ED to floor  Jake Flores  40 y.o.  male    HPI :  HPI  Stated Reason for Visit: Left side flank pain and difficulty urinating  History Obtained From: patient    Chief Complaint  Chief Complaint   Patient presents with    Flank Pain    Urinary Retention       Admitting doctor:   July Heath MD    Admitting diagnosis:   The primary encounter diagnosis was Kidney stone. Diagnoses of Acute kidney injury and Leukocytosis, unspecified type were also pertinent to this visit.    Code status:   Current Code Status       Date Active Code Status Order ID Comments User Context       Not on file            Allergies:   Patient has no known allergies.    Isolation:   No active isolations    Intake and Output    Intake/Output Summary (Last 24 hours) at 7/13/2025 2306  Last data filed at 7/13/2025 2253  Gross per 24 hour   Intake 1000 ml   Output 325 ml   Net 675 ml       Weight:       07/13/25 1952   Weight: 118 kg (260 lb)       Most recent vitals:   Vitals:    07/13/25 1952 07/13/25 1954 07/13/25 2209 07/13/25 2230   BP:  159/96  178/94   BP Location:  Right arm     Patient Position:  Sitting     Pulse: 92  95 81   Resp: 18      Temp: 98.2 °F (36.8 °C)      TempSrc: Oral      SpO2: 99%  98% 98%   Weight: 118 kg (260 lb)      Height: 180.3 cm (71\")          Active LDAs/IV Access:   Lines, Drains & Airways       Active LDAs       Name Placement date Placement time Site Days    Peripheral IV 07/13/25 2047 20 G Anterior;Right Forearm 07/13/25 2047  Forearm  less than 1                    Labs (abnormal labs have a star):   Labs Reviewed   COMPREHENSIVE METABOLIC PANEL - Abnormal; Notable for the following components:       Result Value    Glucose 116 (*)     Creatinine 1.38 (*)     CO2 20.9 (*)     ALT (SGPT) 60 (*)     All other components within normal limits    Narrative:     GFR Categories in Chronic Kidney Disease (CKD)              GFR Category          GFR (mL/min/1.73)    Interpretation  G1               "      90 or greater        Normal or high (1)  G2                    60-89                Mild decrease (1)  G3a                   45-59                Mild to moderate decrease  G3b                   30-44                Moderate to severe decrease  G4                    15-29                Severe decrease  G5                    14 or less           Kidney failure    (1)In the absence of evidence of kidney disease, neither GFR category G1 or G2 fulfill the criteria for CKD.    eGFR calculation 2021 CKD-EPI creatinine equation, which does not include race as a factor   PROTIME-INR - Abnormal; Notable for the following components:    Protime 14.5 (*)     INR 1.14 (*)     All other components within normal limits   URINALYSIS W/ MICROSCOPIC IF INDICATED (NO CULTURE) - Abnormal; Notable for the following components:    Ketones, UA 15 mg/dL (1+) (*)     Blood, UA Large (3+) (*)     All other components within normal limits   CBC WITH AUTO DIFFERENTIAL - Abnormal; Notable for the following components:    WBC 20.78 (*)     Neutrophil % 87.1 (*)     Lymphocyte % 6.4 (*)     Neutrophils, Absolute 18.10 (*)     Monocytes, Absolute 1.09 (*)     Immature Grans, Absolute 0.10 (*)     All other components within normal limits   URINALYSIS, MICROSCOPIC ONLY - Abnormal; Notable for the following components:    RBC, UA Too Numerous to Count (*)     All other components within normal limits   MAGNESIUM - Normal   PROCALCITONIN - Normal    Narrative:     As a Marker for Sepsis (Non-Neonates):    1. <0.5 ng/mL represents a low risk of severe sepsis and/or septic shock.  2. >2 ng/mL represents a high risk of severe sepsis and/or septic shock.    As a Marker for Lower Respiratory Tract Infections that require antibiotic therapy:    PCT on Admission    Antibiotic Therapy       6-12 Hrs later    >0.5                Strongly Recommended  >0.25 - <0.5        Recommended   0.1 - 0.25          Discouraged              Remeasure/reassess  "PCT  <0.1                Strongly Discouraged     Remeasure/reassess PCT    As 28 day mortality risk marker: \"Change in Procalcitonin Result\" (>80% or <=80%) if Day 0 (or Day 1) and Day 4 values are available. Refer to http://www.[facility]-pct-calculator.com    Change in PCT <=80%  A decrease of PCT levels below or equal to 80% defines a positive change in PCT test result representing a higher risk for 28-day all-cause mortality of patients diagnosed with severe sepsis for septic shock.    Change in PCT >80%  A decrease of PCT levels of more than 80% defines a negative change in PCT result representing a lower risk for 28-day all-cause mortality of patients diagnosed with severe sepsis or septic shock.      URINALYSIS W/ CULTURE IF INDICATED   CBC AND DIFFERENTIAL    Narrative:     The following orders were created for panel order CBC & Differential.  Procedure                               Abnormality         Status                     ---------                               -----------         ------                     CBC Auto Differential[[id number]]        Abnormal            Final result                 Please view results for these tests on the individual orders.       EKG:   No orders to display       Meds given in ED:   Medications   sodium chloride 0.9 % flush 10 mL (has no administration in time range)   sodium chloride 0.9 % bolus 1,000 mL (1,000 mL Intravenous New Bag 7/13/25 2254)   cefTRIAXone (ROCEPHIN) 2,000 mg in sodium chloride 0.9 % 100 mL MBP (2,000 mg Intravenous New Bag 7/13/25 2259)   sodium chloride 0.9 % bolus 1,000 mL (0 mL Intravenous Stopped 7/13/25 2253)   droperidol (INAPSINE) injection 1.25 mg (1.25 mg Intravenous Given 7/13/25 2048)   diphenhydrAMINE (BENADRYL) injection 25 mg (25 mg Intravenous Given 7/13/25 2048)   iopamidol (ISOVUE-300) 61 % injection 85 mL (85 mL Intravenous Given 7/13/25 2201)       Imaging results:  No radiology results for the last day    Ambulatory status:   - " as tolerated    Social issues:   Social History     Socioeconomic History    Marital status:    Tobacco Use    Smoking status: Every Day     Current packs/day: 1.00     Types: Cigarettes    Smokeless tobacco: Never    Tobacco comments:     smoked 6-10 years    Substance and Sexual Activity    Alcohol use: Never    Drug use: Never       Peripheral Neurovascular  Peripheral Neurovascular (Adult)  Peripheral Neurovascular WDL: all, WDL    Neuro Cognitive  Neuro Cognitive (Adult)  Cognitive/Neuro/Behavioral WDL: WDL, orientation  Orientation: oriented x 4    Learning  Learning Assessment  Learning Readiness and Ability: no barriers identified  Education Provided  Person Taught: patient  Teaching Method: verbal instruction  Teaching Focus: symptom/problem overview, diagnostic test  Education Outcome Evaluation: acceptance expressed, verbalizes understanding    Respiratory  Respiratory WDL  Respiratory WDL: WDL    Abdominal Pain  Safety Interventions  Safety Precautions/Falls Reduction: assistive device/personal items within reach  All Alarms: none present    Pain Assessments  Pain (Adult)  (0-10) Pain Rating: Rest: 10  (0-10) Pain Rating: Activity: 10  Pain Location: flank    NIH Stroke Scale       Kamryn Hollingsworth RN  07/13/25 23:06 EDT

## 2025-07-14 NOTE — DISCHARGE INSTRUCTIONS
Be sure to call Dr. Sibley if you have not heard from their office this afternoon to schedule your lithotripsy

## 2025-07-14 NOTE — PLAN OF CARE
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 Pain was alleviated by Lidocaine infusion and tylenol. 8mm stone located in the left ureter. Urology consult in the morning.

## 2025-07-14 NOTE — DISCHARGE SUMMARY
August 10, 2023       Jj Suazo MD  [address]  [address]  [facility] [address]  Via In Basket      Patient: Blanche Mcnamara   YOB: 2012   Date of Visit: 8/10/2023       Dear Dr. Suazo:    I saw your patient, Blanche Mcnamara, for an evaluation. Below are my notes for this visit with him.    If you have questions, please do not hesitate to call me.      Sincerely,        Stanley Estevez MD        CC: No Recipients    Stanley Estevez MD  8/10/2023 11:33 AM  Signed  I saw Blanche for advice and consultation at the [place] location today.  As you know the patient is 11 year old and has rectal bleeding.  For the past 3 weeks the patient has had blood on a formed stool and with wiping.  His stools have been normally formed. He denies any rectal or abdominal pain. No weight loss. There has been no fever, mouth sores, joint swelling or rash. Fecal calprotectin was normal.       PmHx-As above, Reviewed.  FmHx-Mom Crohn's-No meds, MGM UC  SocHx-5th grade    ROS:    No rash, respiratory symptoms, neck swelling, temperature intolerance, joint problems, heart murmur, seizures, swollen glands or fevers.  Others of 12 systems reviewed and negative.     PE:    Skin-Anicteric and without exanthema  MM's moist and without lesion  Neck Supple and without adenopathy  Lungs clear  Heart without murmur  Abd; Soft, non-tender and non-distended with normally active bowel sounds.  No masses or hepatosplenomegaly were appreciated.  Rectal-Fissure with small skin tag. No active bleeding. Stool in vault is firm and negative for occult blood. No masses.    Exts; No clubbing, cyanosis or edema  Neuro intact grossly.     Impression:  Rectal bleeding with a small anal fissure. Course is complciated by fmhx of IBD.  Normal fecal calprotectin is reassuring, but still possible. I had an extensive discussion with the family about management options. I ordered blood work today and started stool softener therapy. Depending on the results  ED OBSERVATION PROGRESS/DISCHARGE SUMMARY    Date of Admission: 7/13/2025   LOS: 0 days   PCP: Pat Ny, MARYAM    Final Diagnosis ureterolithiasis      Subjective     Hospital Outcome:     Patient is a pleasant afebrile ambulatory 40-year-old  gentleman admitted to the ED observation unit for left flank discomfort and CT findings consistent with ureterolithiasis.    Patient's white blood cell count yesterday was 20, this morning's improved to 16.  His creatinine improved from 1.38-1.24 this morning.  He has remained afebrile overnight and he denies any fevers, chills or dysuria.    This morning patient reports his pain is resolved at present.    Patient was seen and evaluated by Dr. Sibley with the first urology team who advises that possible plan for lithotripsy and stent placement, further phone call with MD unfortunately the lithotripsy machine is not available at [facility] today, recommends discharging patient home with short course of pain medication and Keflex 500 twice daily and their office will call to schedule lithotripsy and ureteral stenting as an outpatient.  Patient is agreeable to plan.    ROS:  General: no fevers, chills  Respiratory: no cough, dyspnea  Cardiovascular: no chest pain, palpitations  Abdomen: + abdominal pain, no nausea, vomiting, or diarrhea  Neurologic: No focal weakness    Objective   Physical Exam:  I have reviewed the vital signs.  Temp:  [97.9 °F (36.6 °C)-98.2 °F (36.8 °C)] 97.9 °F (36.6 °C)  Heart Rate:  [81-95] 86  Resp:  [16-18] 16  BP: (146-178)/(87-96) 147/87  General Appearance:    Alert, cooperative, no distress  Head:    Normocephalic, atraumatic  Eyes:    Sclerae anicteric  Neck:   Supple, no mass  Lungs: Clear to auscultation bilaterally, respirations unlabored  Heart: Regular rate and rhythm, S1 and S2 normal, no murmur, rub or gallop  Abdomen:  Soft, nontender, bowel sounds active, obese, no organomegaly appreciated  Extremities: No clubbing, cyanosis, or  and treatment outcome the patient may require more evaluation including colonoscopy. I discussed the risks of the procedure including, but not limited to anesthesia, bleeding and perforation.      I will,  Of course, be available for any follow up as needed.     Thank you for the opportunity of participating in this youngster's care.        edema to lower extremities  Pulses:  2+ and symmetric in distal lower extremities  Skin: No rashes   Neurologic: Oriented x3, Normal strength to extremities    Results Review:    I have reviewed the labs, radiology results and diagnostic studies.    Results from last 7 days   Lab Units 07/14/25  0332   WBC 10*3/mm3 16.68*   HEMOGLOBIN g/dL 15.3   HEMATOCRIT % 45.5   PLATELETS 10*3/mm3 274     Results from last 7 days   Lab Units 07/14/25  0332 07/13/25  2041   SODIUM mmol/L 137 138   POTASSIUM mmol/L 4.0 4.0   CHLORIDE mmol/L 106 104   CO2 mmol/L 19.0* 20.9*   BUN mg/dL 11.0 12.0   CREATININE mg/dL 1.24 1.38*   CALCIUM mg/dL 8.8 9.3   BILIRUBIN mg/dL 0.4 0.3   ALK PHOS U/L 73 86   ALT (SGPT) U/L 46* 60*   AST (SGOT) U/L 28 28   GLUCOSE mg/dL 116* 116*     Imaging Results (Last 24 Hours)       Procedure Component Value Units Date/Time    CT Abdomen Pelvis With Contrast [[id number]] Collected: 07/13/25 2258     Updated: 07/13/25 2307    Narrative:      CT  ABDOMEN & PELVIS WITH IV CONTRAST     HISTORY: Left flank pain.     TECHNIQUE:  CT of the abdomen and pelvis with  IV contrast. Coronal and sagittal  reconstructions were obtained.  Radiation dose reduction techniques were  utilized, including automated exposure control, and exposure modulation  based on body size.     COMPARISON:   None available.     FINDINGS:     Lung bases: Visualized lung bases are unremarkable.     Abdomen: There is mild fatty infiltration of the liver, but no hepatic  mass or biliary obstruction. The gallbladder appears normal. The spleen  and pancreas appear normal.  Both adrenal glands are normal. There is no  evidence of bowel obstruction. The aorta is normal in caliber.       : The right kidney appears normal. Left kidney shows a delay in  contrast enhancement, perinephric stranding, and moderate  hydronephrosis. This appears secondary to an approximately 8 mm stone in  the upper left ureter at or just beyond the ureteropelvic junction.  No  additional or more distal ureteral calculi are seen. No bladder calculi  are seen.     Pelvis:  The appendix is clearly identified, and is normal.  There is no  pelvic inflammatory change or other abnormal fluid collection.  There is  no pelvic adenopathy or other soft tissue mass.  There is no CT evidence  of hernia or bowel obstruction.     There is no acute bony abnormality.       Impression:         Mild to moderate left hydronephrosis secondary to an approximately 8 mm  stone in the upper left ureter, at or just beyond the ureteropelvic  junction.     Fatty infiltration of the liver.              This report was finalized on 7/13/2025 11:04 PM by Dr. Mata Fernandes M.D on Workstation: [id number]               I have reviewed the medications.     Discharge Medications      Patient Not Prescribed Medications Upon Discharge        ---------------------------------------------------------------------------------------------  Assessment & Plan   Assessment/Problem List    Ureteral stone      Plan:    Left ureteral stone  Left flank pain  - CTAP shows 8 mm left ureteral stone with mild to moderate hydronephrosis  - Urology consult, plan for outpatient management of ureterolithiasis       Disposition: Home    Follow-up after Discharge: PCP and urology    This note will serve as a discharge summary    JESS Ojeda 07/14/25 10:37 EDT    I have worn appropriate PPE during this patient encounter, sanitized my hands both with entering and exiting patient's room.      34 minutes has been spent by [facility] providers in the care of this patient while under observation status on this date 07/14/25

## 2025-07-14 NOTE — CONSULTS
FIRST UROLOGY CONSULT      Patient Identification:  NAME:  Jake Flores  Age:  40 y.o.   Sex:  male   :  1985   MRN:  [id number]     Chief complaint: Left flank pain    History of present illness:      This is a 40 year old man with a history of urolithiasis who presents to the ER with left flank pain and was found to have a 8 mm proximal left ureteral stone. Denies fevers or recent UTI. He does not use blood thinners.    In hospital:  -AVSS, good UOP  -WBC - 16.68  -Creat - 1.24  -UA - large blood, neg LE, neg nitrites    -CT - 8 mm L proximal ureteral stone. Mod hydro.      Past medical history:  Past Medical History:   Diagnosis Date    Kidney calculi        Past surgical history:  Past Surgical History:   Procedure Laterality Date    EYE SURGERY      eye implant, yes     HERNIA REPAIR      KIDNEY SURGERY      KNEE SURGERY         Allergies:  Patient has no known allergies.    Home medications:  No medications prior to admission.        Hospital medications:  sodium chloride, 10 mL, Intravenous, Q12H           acetaminophen    senna-docusate sodium **AND** polyethylene glycol **AND** bisacodyl **AND** bisacodyl    ondansetron    [COMPLETED] Insert Peripheral IV **AND** sodium chloride    sodium chloride    sodium chloride    Family history:  Family History   Problem Relation Age of Onset    Diabetes Mother     Heart disease Father     Diabetes Father        Social history:  Social History     Tobacco Use    Smoking status: Every Day     Current packs/day: 1.00     Types: Cigarettes    Smokeless tobacco: Never    Tobacco comments:     smoked 6-10 years    Vaping Use    Vaping status: Never Used   Substance Use Topics    Alcohol use: Never    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       Review of systems:      Positive for:  nothing  Negative for:  chest pain, cough, sob, o/w neg    Objective:  TMax 24 hours:   Temp (24hrs), Av.1 °F (36.7 °C), Min:97.9 °F (36.6 °C), Max:98.2 °F (36.8  °C)      Vitals Ranges:   Temp:  [97.9 °F (36.6 °C)-98.2 °F (36.8 °C)] 97.9 °F (36.6 °C)  Heart Rate:  [81-95] 86  Resp:  [16-18] 16  BP: (146-178)/(87-96) 147/87    Intake/Output Last 3 shifts:  I/O last 3 completed shifts:  In: 1000 [IV Piggyback:1000]  Out: 325 [Urine:325]     Physical Exam:    General Appearance:    Alert, cooperative, NAD   Back:     No CVA tenderness   Lungs:     Respirations unlabored, no wheezing    Heart:    RRR, intact peripheral pulses   Abdomen:     Soft, NDNT, no masses, no guarding   Neuro/Psych:   Orientation intact, mood/affect pleasant       Results review:   I reviewed the patient's new clinical results.    Data review:  Lab Results (last 24 hours)       Procedure Component Value Units Date/Time    Comprehensive Metabolic Panel [[id number]]  (Abnormal) Collected: 07/14/25 0332    Specimen: Blood from Arm, Left Updated: 07/14/25 0511     Glucose 116 mg/dL      BUN 11.0 mg/dL      Creatinine 1.24 mg/dL      Sodium 137 mmol/L      Potassium 4.0 mmol/L      Chloride 106 mmol/L      CO2 19.0 mmol/L      Calcium 8.8 mg/dL      Total Protein 6.7 g/dL      Albumin 3.9 g/dL      ALT (SGPT) 46 U/L      AST (SGOT) 28 U/L      Alkaline Phosphatase 73 U/L      Total Bilirubin 0.4 mg/dL      Globulin 2.8 gm/dL      A/G Ratio 1.4 g/dL      BUN/Creatinine Ratio 8.9     Anion Gap 12.0 mmol/L      eGFR 75.8 mL/min/1.73     Narrative:      GFR Categories in Chronic Kidney Disease (CKD)              GFR Category          GFR (mL/min/1.73)    Interpretation  G1                    90 or greater        Normal or high (1)  G2                    60-89                Mild decrease (1)  G3a                   45-59                Mild to moderate decrease  G3b                   30-44                Moderate to severe decrease  G4                    15-29                Severe decrease  G5                    14 or less           Kidney failure    (1)In the absence of evidence of kidney disease, neither GFR  category G1 or G2 fulfill the criteria for CKD.    eGFR calculation 2021 CKD-EPI creatinine equation, which does not include race as a factor    Protime-INR [[id number]]  (Abnormal) Collected: 07/14/25 0332    Specimen: Blood from Arm, Left Updated: 07/14/25 0500     Protime 14.3 Seconds      INR 1.11    CBC Auto Differential [[id number]]  (Abnormal) Collected: 07/14/25 0332    Specimen: Blood from Arm, Left Updated: 07/14/25 0451     WBC 16.68 10*3/mm3      RBC 4.90 10*6/mm3      Hemoglobin 15.3 g/dL      Hematocrit 45.5 %      MCV 92.9 fL      MCH 31.2 pg      MCHC 33.6 g/dL      RDW 12.7 %      RDW-SD 43.7 fl      MPV 9.8 fL      Platelets 274 10*3/mm3      Neutrophil % 84.9 %      Lymphocyte % 8.2 %      Monocyte % 6.2 %      Eosinophil % 0.1 %      Basophil % 0.2 %      Immature Grans % 0.4 %      Neutrophils, Absolute 14.18 10*3/mm3      Lymphocytes, Absolute 1.37 10*3/mm3      Monocytes, Absolute 1.03 10*3/mm3      Eosinophils, Absolute 0.01 10*3/mm3      Basophils, Absolute 0.03 10*3/mm3      Immature Grans, Absolute 0.06 10*3/mm3      nRBC 0.0 /100 WBC     Urinalysis With Microscopic If Indicated (No Culture) - Urine, Clean Catch [[id number]]  (Abnormal) Collected: 07/13/25 2253    Specimen: Urine, Clean Catch Updated: 07/13/25 2305     Color, UA Yellow     Appearance, UA Clear     pH, UA 6.0     Specific Gravity, UA 1.028     Glucose, UA Negative     Ketones, UA 15 mg/dL (1+)     Bilirubin, UA Negative     Blood, UA Large (3+)     Protein, UA Negative     Leuk Esterase, UA Negative     Nitrite, UA Negative     Urobilinogen, UA 0.2 E.U./dL    Urinalysis, Microscopic Only - Urine, Clean Catch [[id number]]  (Abnormal) Collected: 07/13/25 2253    Specimen: Urine, Clean Catch Updated: 07/13/25 2305     RBC, UA Too Numerous to Count /HPF      WBC, UA 0-2 /HPF      Bacteria, UA None Seen /HPF      Squamous Epithelial Cells, UA 0-2 /HPF      Hyaline Casts, UA None Seen /LPF      Methodology Automated Microscopy     "Procalcitonin [[id number]]  (Normal) Collected: 07/13/25 2041    Specimen: Blood Updated: 07/13/25 2301     Procalcitonin 0.11 ng/mL     Narrative:      As a Marker for Sepsis (Non-Neonates):    1. <0.5 ng/mL represents a low risk of severe sepsis and/or septic shock.  2. >2 ng/mL represents a high risk of severe sepsis and/or septic shock.    As a Marker for Lower Respiratory Tract Infections that require antibiotic therapy:    PCT on Admission    Antibiotic Therapy       6-12 Hrs later    >0.5                Strongly Recommended  >0.25 - <0.5        Recommended   0.1 - 0.25          Discouraged              Remeasure/reassess PCT  <0.1                Strongly Discouraged     Remeasure/reassess PCT    As 28 day mortality risk marker: \"Change in Procalcitonin Result\" (>80% or <=80%) if Day 0 (or Day 1) and Day 4 values are available. Refer to http://www.[employer]-pct-calculator.com    Change in PCT <=80%  A decrease of PCT levels below or equal to 80% defines a positive change in PCT test result representing a higher risk for 28-day all-cause mortality of patients diagnosed with severe sepsis for septic shock.    Change in PCT >80%  A decrease of PCT levels of more than 80% defines a negative change in PCT result representing a lower risk for 28-day all-cause mortality of patients diagnosed with severe sepsis or septic shock.       Protime-INR [[id number]]  (Abnormal) Collected: 07/13/25 2041    Specimen: Blood Updated: 07/13/25 2139     Protime 14.5 Seconds      INR 1.14    Comprehensive Metabolic Panel [[id number]]  (Abnormal) Collected: 07/13/25 2041    Specimen: Blood Updated: 07/13/25 2116     Glucose 116 mg/dL      BUN 12.0 mg/dL      Creatinine 1.38 mg/dL      Sodium 138 mmol/L      Potassium 4.0 mmol/L      Chloride 104 mmol/L      CO2 20.9 mmol/L      Calcium 9.3 mg/dL      Total Protein 7.5 g/dL      Albumin 4.4 g/dL      ALT (SGPT) 60 U/L      AST (SGOT) 28 U/L      Alkaline Phosphatase 86 U/L      Total " Bilirubin 0.3 mg/dL      Globulin 3.1 gm/dL      A/G Ratio 1.4 g/dL      BUN/Creatinine Ratio 8.7     Anion Gap 13.1 mmol/L      eGFR 66.7 mL/min/1.73     Narrative:      GFR Categories in Chronic Kidney Disease (CKD)              GFR Category          GFR (mL/min/1.73)    Interpretation  G1                    90 or greater        Normal or high (1)  G2                    60-89                Mild decrease (1)  G3a                   45-59                Mild to moderate decrease  G3b                   30-44                Moderate to severe decrease  G4                    15-29                Severe decrease  G5                    14 or less           Kidney failure    (1)In the absence of evidence of kidney disease, neither GFR category G1 or G2 fulfill the criteria for CKD.    eGFR calculation 2021 CKD-EPI creatinine equation, which does not include race as a factor    Magnesium [[id number]]  (Normal) Collected: 07/13/25 2041    Specimen: Blood Updated: 07/13/25 2116     Magnesium 1.7 mg/dL     CBC & Differential [[id number]]  (Abnormal) Collected: 07/13/25 2041    Specimen: Blood Updated: 07/13/25 2057    Narrative:      The following orders were created for panel order CBC & Differential.  Procedure                               Abnormality         Status                     ---------                               -----------         ------                     CBC Auto Differential[[id number]]        Abnormal            Final result                 Please view results for these tests on the individual orders.    CBC Auto Differential [[id number]]  (Abnormal) Collected: 07/13/25 2041    Specimen: Blood Updated: 07/13/25 2057     WBC 20.78 10*3/mm3      RBC 4.97 10*6/mm3      Hemoglobin 15.1 g/dL      Hematocrit 45.9 %      MCV 92.4 fL      MCH 30.4 pg      MCHC 32.9 g/dL      RDW 12.7 %      RDW-SD 42.8 fl      MPV 9.2 fL      Platelets 298 10*3/mm3      Neutrophil % 87.1 %      Lymphocyte % 6.4 %      Monocyte % 5.2  %      Eosinophil % 0.5 %      Basophil % 0.3 %      Immature Grans % 0.5 %      Neutrophils, Absolute 18.10 10*3/mm3      Lymphocytes, Absolute 1.33 10*3/mm3      Monocytes, Absolute 1.09 10*3/mm3      Eosinophils, Absolute 0.10 10*3/mm3      Basophils, Absolute 0.06 10*3/mm3      Immature Grans, Absolute 0.10 10*3/mm3      nRBC 0.0 /100 WBC              Imaging:  Imaging Results (Last 24 Hours)       Procedure Component Value Units Date/Time    CT Abdomen Pelvis With Contrast [[id number]] Collected: 07/13/25 2258     Updated: 07/13/25 2307    Narrative:      CT  ABDOMEN & PELVIS WITH IV CONTRAST     HISTORY: Left flank pain.     TECHNIQUE:  CT of the abdomen and pelvis with  IV contrast. Coronal and sagittal  reconstructions were obtained.  Radiation dose reduction techniques were  utilized, including automated exposure control, and exposure modulation  based on body size.     COMPARISON:   None available.     FINDINGS:     Lung bases: Visualized lung bases are unremarkable.     Abdomen: There is mild fatty infiltration of the liver, but no hepatic  mass or biliary obstruction. The gallbladder appears normal. The spleen  and pancreas appear normal.  Both adrenal glands are normal. There is no  evidence of bowel obstruction. The aorta is normal in caliber.       : The right kidney appears normal. Left kidney shows a delay in  contrast enhancement, perinephric stranding, and moderate  hydronephrosis. This appears secondary to an approximately 8 mm stone in  the upper left ureter at or just beyond the ureteropelvic junction. No  additional or more distal ureteral calculi are seen. No bladder calculi  are seen.     Pelvis:  The appendix is clearly identified, and is normal.  There is no  pelvic inflammatory change or other abnormal fluid collection.  There is  no pelvic adenopathy or other soft tissue mass.  There is no CT evidence  of hernia or bowel obstruction.     There is no acute bony abnormality.        Impression:         Mild to moderate left hydronephrosis secondary to an approximately 8 mm  stone in the upper left ureter, at or just beyond the ureteropelvic  junction.     Fatty infiltration of the liver.              This report was finalized on 7/13/2025 11:04 PM by Dr. Mata Fernandes M.D on Workstation: [id number]                  Assessment:     Left proximal ureteral stone    Plan:     Discussed options including trial of passage, stent placement, or ESWL with left ureteral stent.  - The patient would like to move forward with left ESWL and possible stent placement. We will try to get him on the schedule today. If we are unable to, the patient would prefer to be discharged rather than undergo stent placement.  - We discussed the risks of the procedure including bleeding, infection, damage to surrounding structures, pain, need to perform additional procedures, possible need for a stent, possible long term risks to kidney function, risks of anesthesia. The patient understands these risks and would like to proceed.    Rigoberto Sibley Jr., MD  07/14/25  07:28 EDT

## 2025-07-14 NOTE — PLAN OF CARE
Goal Outcome Evaluation:         Pt is alert, oriented and ambulatory. Denies any pain or nausea currently. Education provided re: pain control, nausea and the importance of staying hydrated. Family is at the bedside and also verbalizes understanding. All discharge instructions reviewed and all questions answered. No new concerns or symptoms.

## 2025-07-16 NOTE — CASE MANAGEMENT/SOCIAL WORK
Case Management Discharge Note      Final Note: Home via private vehicle         Selected Continued Care - Discharged on 7/14/2025 Admission date: 7/13/2025 - Discharge disposition: Home or Self Care      Destination    No services have been selected for the patient.                Durable Medical Equipment    No services have been selected for the patient.                Dialysis/Infusion    No services have been selected for the patient.                Home Medical Care    No services have been selected for the patient.                Therapy    No services have been selected for the patient.                Community Resources    No services have been selected for the patient.                Community & DME    No services have been selected for the patient.                    Transportation Services  Transportation: Private Transportation  Private: Car    Final Discharge Disposition Code: 01 - home or self-care

## 2025-07-25 PROCEDURE — 82365 CALCULUS SPECTROSCOPY: CPT | Performed by: UROLOGY

## 2025-07-28 ENCOUNTER — LAB REQUISITION (OUTPATIENT)
Dept: LAB | Facility: HOSPITAL | Age: 40
End: 2025-07-28
Payer: COMMERCIAL

## 2025-07-28 DIAGNOSIS — N20.0 CALCULUS OF KIDNEY: ICD-10-CM

## 2025-08-07 LAB
CALCIUM OXALATE DIHYDRATE MFR STONE IR: 80 %
COLOR STONE: NORMAL
COM MFR STONE: 10 %
HYDROXYAPATITE: 10 %
LABORATORY COMMENT REPORT: NORMAL
SIZE STONE: NORMAL MM
SPEC SOURCE SUBJ: NORMAL
WT STONE: 52 MG